# Patient Record
Sex: FEMALE | Race: BLACK OR AFRICAN AMERICAN | NOT HISPANIC OR LATINO | URBAN - NONMETROPOLITAN AREA
[De-identification: names, ages, dates, MRNs, and addresses within clinical notes are randomized per-mention and may not be internally consistent; named-entity substitution may affect disease eponyms.]

---

## 2022-09-11 ENCOUNTER — HISTORICAL (OUTPATIENT)
Dept: ADMINISTRATIVE | Facility: HOSPITAL | Age: 27
End: 2022-09-11

## 2023-06-22 ENCOUNTER — HOSPITAL ENCOUNTER (EMERGENCY)
Facility: HOSPITAL | Age: 28
Discharge: HOME | End: 2023-06-22
Attending: EMERGENCY MEDICINE
Payer: MEDICAID

## 2023-06-22 VITALS
OXYGEN SATURATION: 100 % | HEART RATE: 92 BPM | TEMPERATURE: 98 F | SYSTOLIC BLOOD PRESSURE: 150 MMHG | DIASTOLIC BLOOD PRESSURE: 95 MMHG | WEIGHT: 130 LBS | HEIGHT: 62 IN | BODY MASS INDEX: 23.92 KG/M2 | RESPIRATION RATE: 16 BRPM

## 2023-06-22 DIAGNOSIS — O03.9 MISCARRIAGE: Primary | ICD-10-CM

## 2023-06-22 DIAGNOSIS — O46.90 VAGINAL BLEEDING IN PREGNANCY: ICD-10-CM

## 2023-06-22 LAB
ANION GAP SERPL CALC-SCNC: 9 MEQ/L
BASOPHILS # BLD AUTO: 0.04 X10(3)/MCL
BASOPHILS NFR BLD AUTO: 0.3 %
BUN SERPL-MCNC: 4.5 MG/DL (ref 7–18.7)
CALCIUM SERPL-MCNC: 9.4 MG/DL (ref 8.4–10.2)
CHLORIDE SERPL-SCNC: 105 MMOL/L (ref 98–107)
CO2 SERPL-SCNC: 21 MMOL/L (ref 22–29)
CREAT SERPL-MCNC: 0.76 MG/DL (ref 0.55–1.02)
CREAT/UREA NIT SERPL: 6
EOSINOPHIL # BLD AUTO: 0.1 X10(3)/MCL (ref 0–0.9)
EOSINOPHIL NFR BLD AUTO: 0.6 %
ERYTHROCYTE [DISTWIDTH] IN BLOOD BY AUTOMATED COUNT: 13.2 % (ref 11.5–17)
GFR SERPLBLD CREATININE-BSD FMLA CKD-EPI: >60 MLS/MIN/1.73/M2
GLUCOSE SERPL-MCNC: 105 MG/DL (ref 74–100)
GROUP & RH: NORMAL
HCT VFR BLD AUTO: 34.3 % (ref 37–47)
HGB BLD-MCNC: 11.7 G/DL (ref 12–16)
IMM GRANULOCYTES # BLD AUTO: 0.06 X10(3)/MCL (ref 0–0.04)
IMM GRANULOCYTES NFR BLD AUTO: 0.4 %
INDIRECT COOMBS GEL: NORMAL
LYMPHOCYTES # BLD AUTO: 1.41 X10(3)/MCL (ref 0.6–4.6)
LYMPHOCYTES NFR BLD AUTO: 9.1 %
MCH RBC QN AUTO: 30.2 PG (ref 27–31)
MCHC RBC AUTO-ENTMCNC: 34.1 G/DL (ref 33–36)
MCV RBC AUTO: 88.6 FL (ref 80–94)
MONOCYTES # BLD AUTO: 1.1 X10(3)/MCL (ref 0.1–1.3)
MONOCYTES NFR BLD AUTO: 7.1 %
NEUTROPHILS # BLD AUTO: 12.87 X10(3)/MCL (ref 2.1–9.2)
NEUTROPHILS NFR BLD AUTO: 82.5 %
NRBC BLD AUTO-RTO: 0 %
PLATELET # BLD AUTO: 264 X10(3)/MCL (ref 130–400)
PMV BLD AUTO: 10.1 FL (ref 7.4–10.4)
POTASSIUM SERPL-SCNC: 3.7 MMOL/L (ref 3.5–5.1)
RBC # BLD AUTO: 3.87 X10(6)/MCL (ref 4.2–5.4)
SODIUM SERPL-SCNC: 135 MMOL/L (ref 136–145)
SPECIMEN OUTDATE: NORMAL
WBC # SPEC AUTO: 15.58 X10(3)/MCL (ref 4.5–11.5)

## 2023-06-22 PROCEDURE — 96375 TX/PRO/DX INJ NEW DRUG ADDON: CPT

## 2023-06-22 PROCEDURE — 85025 COMPLETE CBC W/AUTO DIFF WBC: CPT | Performed by: STUDENT IN AN ORGANIZED HEALTH CARE EDUCATION/TRAINING PROGRAM

## 2023-06-22 PROCEDURE — 99285 EMERGENCY DEPT VISIT HI MDM: CPT | Mod: 25

## 2023-06-22 PROCEDURE — 86900 BLOOD TYPING SEROLOGIC ABO: CPT | Performed by: STUDENT IN AN ORGANIZED HEALTH CARE EDUCATION/TRAINING PROGRAM

## 2023-06-22 PROCEDURE — 80048 BASIC METABOLIC PNL TOTAL CA: CPT | Performed by: STUDENT IN AN ORGANIZED HEALTH CARE EDUCATION/TRAINING PROGRAM

## 2023-06-22 PROCEDURE — 88305 TISSUE EXAM BY PATHOLOGIST: CPT | Performed by: EMERGENCY MEDICINE

## 2023-06-22 PROCEDURE — 63600175 PHARM REV CODE 636 W HCPCS: Performed by: EMERGENCY MEDICINE

## 2023-06-22 PROCEDURE — 96374 THER/PROPH/DIAG INJ IV PUSH: CPT

## 2023-06-22 RX ORDER — MORPHINE SULFATE 4 MG/ML
4 INJECTION, SOLUTION INTRAMUSCULAR; INTRAVENOUS
Status: COMPLETED | OUTPATIENT
Start: 2023-06-22 | End: 2023-06-22

## 2023-06-22 RX ORDER — ONDANSETRON 2 MG/ML
4 INJECTION INTRAMUSCULAR; INTRAVENOUS
Status: COMPLETED | OUTPATIENT
Start: 2023-06-22 | End: 2023-06-22

## 2023-06-22 RX ADMIN — MORPHINE SULFATE 4 MG: 4 INJECTION INTRAVENOUS at 08:06

## 2023-06-22 RX ADMIN — ONDANSETRON 4 MG: 2 INJECTION INTRAMUSCULAR; INTRAVENOUS at 08:06

## 2023-06-22 NOTE — Clinical Note
"Radha Adames" Asmita was seen and treated in our emergency department on 6/22/2023.  She may return to work on 06/23/2023.       If you have any questions or concerns, please don't hesitate to call.       RN    "

## 2023-06-23 NOTE — DISCHARGE INSTRUCTIONS
Follow-up with Dr. Gordon, your OBGYN physician tomorrow morning at 9:00 a.m.  at his office as discussed

## 2023-06-26 LAB — PSYCHE PATHOLOGY RESULT: NORMAL

## 2024-06-30 NOTE — ED PROVIDER NOTES
Encounter Date: 2023    SCRIBE #1 NOTE: I, Senia Vi, am scribing for, and in the presence of,  Ayan Drake MD. I have scribed the following portions of the note - Other sections scribed: HPI, ROS, PE.     History     Chief Complaint   Patient presents with    Vaginal Bleeding     G4, P2 approx 18 weeks pregnant. Heavy vaginal bleeding that began less than 1 hour ago. Fetus passed at 1843 per EMS, unsure if placenta passed. OB is Dr Gordon     26 y/o female with no pertinent pmhx presents to ED for passing fetus with amniotic membrane and contractions onset 1730 today. Pt reports she was approximately 18 weeks pregnant and had mild vaginal bleeding that worsened to heavy bleeding after fetus came out. States she has had x2 vaginal births, and denies any previous miscarriages. She currently feels contractions. Per EMS, fetus passed at 1843, unsure if placenta passed.       OB: Enrique Denney MD    The history is provided by the patient and the EMS personnel. No  was used.   Review of patient's allergies indicates:   Allergen Reactions    Pcn [penicillins]      History reviewed. No pertinent past medical history.  History reviewed. No pertinent surgical history.  History reviewed. No pertinent family history.  Social History     Tobacco Use    Smoking status: Never    Smokeless tobacco: Never     Review of Systems   Constitutional:  Negative for activity change, chills, diaphoresis, fatigue and fever.   HENT:  Negative for congestion, ear pain, sinus pain and sore throat.    Eyes:  Negative for visual disturbance.   Respiratory:  Negative for cough, shortness of breath, wheezing and stridor.    Cardiovascular:  Negative for chest pain, palpitations and leg swelling.   Gastrointestinal:  Negative for constipation, diarrhea, nausea, rectal pain and vomiting.   Genitourinary:  Positive for vaginal bleeding. Negative for dysuria and hematuria.   Musculoskeletal:          Physical Therapy  Facility/Department: [facility] MED SURG  Daily Treatment Note  NAME: Kaye Joyce  : 1944  MRN: [id number]    Date of Service: 2024    Discharge Recommendations:  Continue to assess pending progress, Subacute/Skilled Nursing Facility     Patient Diagnosis(es): The primary encounter diagnosis was Urinary tract infection without hematuria, site unspecified. Diagnoses of Hypomagnesemia and Fall, initial encounter were also pertinent to this visit.    Assessment   Assessment: Bed mobility:Dnaiel/CGA. Transfers:Daniel/CGA. Pt. ambulated 35ftx1 with SC and CGA/MinAx1 for safety. Commode use and transfer. Pt. reporting an increase in pain when ambulating from bathroom to chair. Completed reclined seated BLE therex x15 in all available planes of motion.  Activity Tolerance: Patient tolerated evaluation without incident     Plan    Physical Therapy Plan  General Plan: 2 times a day 7 days a week  Specific Instructions for Next Treatment: 1x/ daily on weekends  Current Treatment Recommendations: Strengthening;ROM;Balance training;Functional mobility training;Transfer training;Gait training;Stair training;Manual;Home exercise program;Pain management;Safety education & training;Patient/Caregiver education & training;Equipment evaluation, education, & procurement;Positioning;Therapeutic activities     Restrictions  Restrictions/Precautions  Restrictions/Precautions: Fall Risk, Weight Bearing, General Precautions, Contact Precautions  Required Braces or Orthoses?: Yes  Upper Extremity Weight Bearing Restrictions  Right Upper Extremity Weight Bearing: Non Weight Bearing  Required Braces or Orthoses  Right Upper Extremity Brace/Splint: Right upper extremity in sling- nursing asked about more of an immobilizer brace, therapist informed nursing to check with central supply     Subjective    Subjective  Subjective: Pt. in bed upon arrival, agreeable to therapy at this time  Pain: When asked pt. stated \"i don't  Contractions   Skin:  Negative for rash.   Neurological:  Negative for dizziness, syncope, weakness, numbness and headaches.   All other systems reviewed and are negative.    Physical Exam     Initial Vitals   BP Pulse Resp Temp SpO2   06/22/23 1912 06/22/23 1912 06/22/23 1912 06/22/23 1914 06/22/23 1912   128/60 92 16 97.9 °F (36.6 °C) 98 %      MAP       --                Physical Exam    Nursing note and vitals reviewed.  Constitutional: She appears well-developed and well-nourished. No distress.   HENT:   Head: Normocephalic and atraumatic.   Eyes: EOM are normal.   Neck: Trachea normal. Neck supple.   Normal range of motion.  Cardiovascular:  Normal rate and regular rhythm.           No murmur heard.  Pulmonary/Chest: Breath sounds normal. No respiratory distress.   Abdominal: Abdomen is soft. Bowel sounds are normal. She exhibits no distension. There is no abdominal tenderness. There is no rebound and no guarding.   Genitourinary:    Genitourinary Comments: Fetus passed with amniotic membrane.     Musculoskeletal:         General: Normal range of motion.      Cervical back: Normal range of motion and neck supple.      Lumbar back: Normal range of motion.     Neurological: She is alert and oriented to person, place, and time. She has normal strength.   Skin: Skin is warm and dry. No rash noted.   Psychiatric: She has a normal mood and affect.       ED Course   Procedures  Labs Reviewed   CBC WITH DIFFERENTIAL - Abnormal; Notable for the following components:       Result Value    WBC 15.58 (*)     RBC 3.87 (*)     Hgb 11.7 (*)     Hct 34.3 (*)     Neut # 12.87 (*)     IG# 0.06 (*)     All other components within normal limits   BASIC METABOLIC PANEL - Abnormal; Notable for the following components:    Sodium Level 135 (*)     Carbon Dioxide 21 (*)     Glucose Level 105 (*)     Blood Urea Nitrogen 4.5 (*)     All other components within normal limits   CBC W/ AUTO DIFFERENTIAL    Narrative:     The following  Therapy;Plan of Care;Equipment  Education Method: Verbal  Barriers to Learning: None  Education Outcome: Verbalized understanding    Therapy Time   Individual Concurrent Group Co-treatment   Time In 0707         Time Out 0733         Minutes 26           Jamilah Ku, KURT           orders were created for panel order CBC auto differential.  Procedure                               Abnormality         Status                     ---------                               -----------         ------                     CBC with Differential[[id number]]        Abnormal            Final result                 Please view results for these tests on the individual orders.   URINALYSIS, REFLEX TO URINE CULTURE   PREGNANCY TEST, URINE RAPID   TYPE & SCREEN   SPECIMEN TO PATHOLOGY          Imaging Results              US OB Limited 1 Or More Gestations (Final result)  Result time 06/22/23 20:54:45   Procedure changed from US OB 14+ Wks, TransAbd, Single Gestation     Final result by Derrick Dawkins MD (06/22/23 20:54:45)                   Impression:      Heterogeneous endometrium may represent some blood products but no dominant retained products of conception identified.      Electronically signed by: Derrick Dawkins  Date:    06/22/2023  Time:    20:54               Narrative:    EXAMINATION:  US OB LIMITED 1 OR MORE GESTATIONS    CLINICAL HISTORY:  vaginal bleeding;Antepartum hemorrhage, unspecified, unspecified trimester    TECHNIQUE:  Multiple real-time images were performed pelvis in various planes by the sonographer    FINDINGS:  The endometrium is diffusely heterogeneous and may be related to some blood products but no dominant retained products of conception identified.  Maximum thickness of the endometrium is 1.38 cm.    Right ovary measures 3.2 x 2.4 x 2.7 cm and the left ovary measures 3.6 x 2.7 x 2.5 cm.  Bilateral unremarkable echotexture of the ovaries.  Bilateral unremarkable arteriovenous flow to the ovaries.  No free fluid within the pelvis identified                                       Medications   morphine injection 4 mg (4 mg Intravenous Given 6/22/23 2058)   ondansetron injection 4 mg (4 mg Intravenous Given 6/22/23 2058)              Scribe Attestation:   Scribe #1: I performed the  above scribed service and the documentation accurately describes the services I performed. I attest to the accuracy of the note.    Attending Attestation:           Physician Attestation for Scribe:  Physician Attestation Statement for Scribe #1: I, Ayan Drake MD, reviewed documentation, as scribed by Senia Limon in my presence, and it is both accurate and complete.             ED Course as of 06/22/23 2235   Thu Jun 22, 2023 2114 Patient denies any complaints at this time.  Patient states she urinated and has very little vaginal bleeding.  Patient refused pelvic exam. [KG]   2130 Dr. Gordon was paged [KG]   2131 Patient brought in passed fetal tissue.  Membranes are intact with fetus inside, placenta present. [KG]   2150 Spoke with Dr. Gordon, states send fetal tissue to pathology, reports okay to discharge to home, will see patient at 9:00 a.m. tomorrow morning in the office [KG]      ED Course User Index  [KG] Ayan Drake MD                   Clinical Impression:   Final diagnoses:  [O46.90] Vaginal bleeding in pregnancy  [O03.9] Miscarriage (Primary)        ED Disposition Condition    Discharge Stable          ED Prescriptions    None       Follow-up Information    None          Ayan Drake MD  06/22/23 2158       Ayan Drake MD  06/22/23 2235

## 2024-07-09 LAB
C TRACH RRNA SPEC QL PROBE: POSITIVE
HBV SURFACE AG SERPL QL IA: NEGATIVE
HIV 1+2 AB+HIV1 P24 AG SERPL QL IA: NEGATIVE
N GONORRHOEAE, AMPLIFIED DNA: NEGATIVE
RPR: NON REACTIVE
RUBELLA IMMUNE STATUS: NORMAL

## 2024-10-20 ENCOUNTER — HOSPITAL ENCOUNTER (OUTPATIENT)
Facility: HOSPITAL | Age: 29
Discharge: HOME | End: 2024-10-21
Attending: OBSTETRICS & GYNECOLOGY | Admitting: OBSTETRICS & GYNECOLOGY
Payer: MEDICAID

## 2024-10-20 DIAGNOSIS — Z3A.33 33 WEEKS GESTATION OF PREGNANCY: Primary | ICD-10-CM

## 2024-10-20 DIAGNOSIS — O16.9 HYPERTENSION AFFECTING PREGNANCY: ICD-10-CM

## 2024-10-20 DIAGNOSIS — O99.013 ANEMIA COMPLICATING PREGNANCY IN THIRD TRIMESTER: ICD-10-CM

## 2024-10-20 DIAGNOSIS — O13.9 GESTATIONAL HYPERTENSION: ICD-10-CM

## 2024-10-20 LAB
ALBUMIN SERPL-MCNC: 2.9 G/DL (ref 3.5–5)
ALBUMIN/GLOB SERPL: 0.7 RATIO (ref 1.1–2)
ALP SERPL-CCNC: 130 UNIT/L (ref 40–150)
ALT SERPL-CCNC: <5 UNIT/L (ref 0–55)
ANION GAP SERPL CALC-SCNC: 10 MEQ/L
AST SERPL-CCNC: 15 UNIT/L (ref 5–34)
BASOPHILS # BLD AUTO: 0.05 X10(3)/MCL
BASOPHILS NFR BLD AUTO: 0.6 %
BILIRUB SERPL-MCNC: 0.3 MG/DL
BUN SERPL-MCNC: 3.7 MG/DL (ref 7–18.7)
CALCIUM SERPL-MCNC: 9 MG/DL (ref 8.4–10.2)
CHLORIDE SERPL-SCNC: 106 MMOL/L (ref 98–107)
CO2 SERPL-SCNC: 21 MMOL/L (ref 22–29)
CREAT SERPL-MCNC: 0.67 MG/DL (ref 0.55–1.02)
CREAT UR-MCNC: 289.5 MG/DL (ref 45–106)
CREAT/UREA NIT SERPL: 6
EOSINOPHIL # BLD AUTO: 0.1 X10(3)/MCL (ref 0–0.9)
EOSINOPHIL NFR BLD AUTO: 1.1 %
ERYTHROCYTE [DISTWIDTH] IN BLOOD BY AUTOMATED COUNT: 13.7 % (ref 11.5–17)
FERRITIN SERPL-MCNC: 9.51 NG/ML (ref 4.63–204)
GFR SERPLBLD CREATININE-BSD FMLA CKD-EPI: >60 ML/MIN/1.73/M2
GLOBULIN SER-MCNC: 4 GM/DL (ref 2.4–3.5)
GLUCOSE SERPL-MCNC: 82 MG/DL (ref 74–100)
GROUP & RH: NORMAL
HCT VFR BLD AUTO: 28.4 % (ref 37–47)
HGB BLD-MCNC: 9 G/DL (ref 12–16)
IMM GRANULOCYTES # BLD AUTO: 0.04 X10(3)/MCL (ref 0–0.04)
IMM GRANULOCYTES NFR BLD AUTO: 0.4 %
INDIRECT COOMBS: NORMAL
IRON SATN MFR SERPL: 6 % (ref 20–50)
IRON SERPL-MCNC: 26 UG/DL (ref 50–170)
LDH SERPL-CCNC: 216 U/L (ref 125–220)
LYMPHOCYTES # BLD AUTO: 1.87 X10(3)/MCL (ref 0.6–4.6)
LYMPHOCYTES NFR BLD AUTO: 20.6 %
MCH RBC QN AUTO: 26.4 PG (ref 27–31)
MCHC RBC AUTO-ENTMCNC: 31.7 G/DL (ref 33–36)
MCV RBC AUTO: 83.3 FL (ref 80–94)
MONOCYTES # BLD AUTO: 0.78 X10(3)/MCL (ref 0.1–1.3)
MONOCYTES NFR BLD AUTO: 8.6 %
NEUTROPHILS # BLD AUTO: 6.23 X10(3)/MCL (ref 2.1–9.2)
NEUTROPHILS NFR BLD AUTO: 68.7 %
NRBC BLD AUTO-RTO: 0 %
PLATELET # BLD AUTO: 234 X10(3)/MCL (ref 130–400)
PMV BLD AUTO: 11.1 FL (ref 7.4–10.4)
POTASSIUM SERPL-SCNC: 3.7 MMOL/L (ref 3.5–5.1)
PROT SERPL-MCNC: 6.9 GM/DL (ref 6.4–8.3)
PROT UR STRIP-MCNC: 70.7 MG/DL
RBC # BLD AUTO: 3.41 X10(6)/MCL (ref 4.2–5.4)
SODIUM SERPL-SCNC: 137 MMOL/L (ref 136–145)
SPECIMEN OUTDATE: NORMAL
T PALLIDUM AB SER QL: NONREACTIVE
TIBC SERPL-MCNC: 413 UG/DL (ref 70–310)
TIBC SERPL-MCNC: 439 UG/DL (ref 250–450)
TRANSFERRIN SERPL-MCNC: 416 MG/DL (ref 180–382)
URATE SERPL-MCNC: 5.1 MG/DL (ref 2.6–6)
URINE PROTEIN/CREATININE RATIO (OLG): 0.2
WBC # BLD AUTO: 9.07 X10(3)/MCL (ref 4.5–11.5)

## 2024-10-20 PROCEDURE — 63600175 PHARM REV CODE 636 W HCPCS: Mod: JZ,JG | Performed by: OBSTETRICS & GYNECOLOGY

## 2024-10-20 PROCEDURE — 84550 ASSAY OF BLOOD/URIC ACID: CPT | Performed by: OBSTETRICS & GYNECOLOGY

## 2024-10-20 PROCEDURE — 96374 THER/PROPH/DIAG INJ IV PUSH: CPT

## 2024-10-20 PROCEDURE — 85025 COMPLETE CBC W/AUTO DIFF WBC: CPT | Performed by: OBSTETRICS & GYNECOLOGY

## 2024-10-20 PROCEDURE — 83540 ASSAY OF IRON: CPT | Performed by: OBSTETRICS & GYNECOLOGY

## 2024-10-20 PROCEDURE — 27000492 HC SLEEVE, SCD T/L

## 2024-10-20 PROCEDURE — 63600175 PHARM REV CODE 636 W HCPCS: Performed by: OBSTETRICS & GYNECOLOGY

## 2024-10-20 PROCEDURE — 86850 RBC ANTIBODY SCREEN: CPT | Performed by: OBSTETRICS & GYNECOLOGY

## 2024-10-20 PROCEDURE — G0378 HOSPITAL OBSERVATION PER HR: HCPCS

## 2024-10-20 PROCEDURE — 86901 BLOOD TYPING SEROLOGIC RH(D): CPT | Performed by: OBSTETRICS & GYNECOLOGY

## 2024-10-20 PROCEDURE — 82570 ASSAY OF URINE CREATININE: CPT | Performed by: OBSTETRICS & GYNECOLOGY

## 2024-10-20 PROCEDURE — 86900 BLOOD TYPING SEROLOGIC ABO: CPT | Performed by: OBSTETRICS & GYNECOLOGY

## 2024-10-20 PROCEDURE — 63600175 PHARM REV CODE 636 W HCPCS

## 2024-10-20 PROCEDURE — 84156 ASSAY OF PROTEIN URINE: CPT | Performed by: OBSTETRICS & GYNECOLOGY

## 2024-10-20 PROCEDURE — 99285 EMERGENCY DEPT VISIT HI MDM: CPT | Mod: 25

## 2024-10-20 PROCEDURE — 96375 TX/PRO/DX INJ NEW DRUG ADDON: CPT

## 2024-10-20 PROCEDURE — 80053 COMPREHEN METABOLIC PANEL: CPT | Performed by: OBSTETRICS & GYNECOLOGY

## 2024-10-20 PROCEDURE — 83615 LACTATE (LD) (LDH) ENZYME: CPT | Performed by: OBSTETRICS & GYNECOLOGY

## 2024-10-20 PROCEDURE — 36415 COLL VENOUS BLD VENIPUNCTURE: CPT | Performed by: OBSTETRICS & GYNECOLOGY

## 2024-10-20 PROCEDURE — 25000003 PHARM REV CODE 250: Performed by: OBSTETRICS & GYNECOLOGY

## 2024-10-20 PROCEDURE — 86780 TREPONEMA PALLIDUM: CPT | Performed by: OBSTETRICS & GYNECOLOGY

## 2024-10-20 PROCEDURE — 96372 THER/PROPH/DIAG INJ SC/IM: CPT | Performed by: OBSTETRICS & GYNECOLOGY

## 2024-10-20 PROCEDURE — 83550 IRON BINDING TEST: CPT | Performed by: OBSTETRICS & GYNECOLOGY

## 2024-10-20 PROCEDURE — 82728 ASSAY OF FERRITIN: CPT | Performed by: OBSTETRICS & GYNECOLOGY

## 2024-10-20 RX ORDER — HYDRALAZINE HYDROCHLORIDE 20 MG/ML
10 INJECTION INTRAMUSCULAR; INTRAVENOUS ONCE
Status: DISCONTINUED | OUTPATIENT
Start: 2024-10-20 | End: 2024-10-20

## 2024-10-20 RX ORDER — LABETALOL HYDROCHLORIDE 5 MG/ML
40 INJECTION, SOLUTION INTRAVENOUS ONCE AS NEEDED
Status: DISCONTINUED | OUTPATIENT
Start: 2024-10-20 | End: 2024-10-21 | Stop reason: HOSPADM

## 2024-10-20 RX ORDER — DIPHENHYDRAMINE HYDROCHLORIDE 50 MG/ML
25 INJECTION INTRAMUSCULAR; INTRAVENOUS EVERY 4 HOURS PRN
Status: DISCONTINUED | OUTPATIENT
Start: 2024-10-20 | End: 2024-10-21 | Stop reason: HOSPADM

## 2024-10-20 RX ORDER — NAPROXEN SODIUM 220 MG/1
81 TABLET, FILM COATED ORAL DAILY
Status: ON HOLD | COMMUNITY
End: 2024-10-21 | Stop reason: HOSPADM

## 2024-10-20 RX ORDER — LABETALOL HCL 20 MG/4 ML
SYRINGE (ML) INTRAVENOUS
Status: DISPENSED
Start: 2024-10-20 | End: 2024-10-20

## 2024-10-20 RX ORDER — LABETALOL HYDROCHLORIDE 5 MG/ML
20 INJECTION, SOLUTION INTRAVENOUS ONCE AS NEEDED
Status: COMPLETED | OUTPATIENT
Start: 2024-10-20 | End: 2024-10-20

## 2024-10-20 RX ORDER — AMOXICILLIN 250 MG
1 CAPSULE ORAL NIGHTLY PRN
Status: DISCONTINUED | OUTPATIENT
Start: 2024-10-20 | End: 2024-10-21 | Stop reason: HOSPADM

## 2024-10-20 RX ORDER — HYDRALAZINE HYDROCHLORIDE 20 MG/ML
10 INJECTION INTRAMUSCULAR; INTRAVENOUS ONCE AS NEEDED
Status: DISCONTINUED | OUTPATIENT
Start: 2024-10-20 | End: 2024-10-21 | Stop reason: HOSPADM

## 2024-10-20 RX ORDER — LABETALOL HYDROCHLORIDE 5 MG/ML
20 INJECTION, SOLUTION INTRAVENOUS ONCE
Status: DISCONTINUED | OUTPATIENT
Start: 2024-10-20 | End: 2024-10-20

## 2024-10-20 RX ORDER — ONDANSETRON HYDROCHLORIDE 2 MG/ML
INJECTION, SOLUTION INTRAVENOUS
Status: COMPLETED
Start: 2024-10-20 | End: 2024-10-20

## 2024-10-20 RX ORDER — LABETALOL HYDROCHLORIDE 5 MG/ML
40 INJECTION, SOLUTION INTRAVENOUS ONCE
Status: DISCONTINUED | OUTPATIENT
Start: 2024-10-20 | End: 2024-10-20

## 2024-10-20 RX ORDER — SODIUM CHLORIDE 0.9 % (FLUSH) 0.9 %
10 SYRINGE (ML) INJECTION
Status: DISCONTINUED | OUTPATIENT
Start: 2024-10-20 | End: 2024-10-21 | Stop reason: HOSPADM

## 2024-10-20 RX ORDER — ONDANSETRON HYDROCHLORIDE 2 MG/ML
4 INJECTION, SOLUTION INTRAVENOUS ONCE
Status: COMPLETED | OUTPATIENT
Start: 2024-10-20 | End: 2024-10-20

## 2024-10-20 RX ORDER — PRENATAL WITH FERROUS FUM AND FOLIC ACID 3080; 920; 120; 400; 22; 1.84; 3; 20; 10; 1; 12; 200; 27; 25; 2 [IU]/1; [IU]/1; MG/1; [IU]/1; MG/1; MG/1; MG/1; MG/1; MG/1; MG/1; UG/1; MG/1; MG/1; MG/1; MG/1
1 TABLET ORAL DAILY
Status: DISCONTINUED | OUTPATIENT
Start: 2024-10-20 | End: 2024-10-21 | Stop reason: HOSPADM

## 2024-10-20 RX ORDER — SIMETHICONE 80 MG
1 TABLET,CHEWABLE ORAL EVERY 6 HOURS PRN
Status: DISCONTINUED | OUTPATIENT
Start: 2024-10-20 | End: 2024-10-21 | Stop reason: HOSPADM

## 2024-10-20 RX ORDER — ACETAMINOPHEN 325 MG/1
650 TABLET ORAL EVERY 6 HOURS PRN
Status: DISCONTINUED | OUTPATIENT
Start: 2024-10-20 | End: 2024-10-21 | Stop reason: HOSPADM

## 2024-10-20 RX ORDER — LABETALOL HYDROCHLORIDE 5 MG/ML
80 INJECTION, SOLUTION INTRAVENOUS ONCE AS NEEDED
Status: DISCONTINUED | OUTPATIENT
Start: 2024-10-20 | End: 2024-10-21 | Stop reason: HOSPADM

## 2024-10-20 RX ORDER — DIPHENHYDRAMINE HCL 25 MG
25 CAPSULE ORAL EVERY 4 HOURS PRN
Status: DISCONTINUED | OUTPATIENT
Start: 2024-10-20 | End: 2024-10-21 | Stop reason: HOSPADM

## 2024-10-20 RX ORDER — BETAMETHASONE SODIUM PHOSPHATE AND BETAMETHASONE ACETATE 3; 3 MG/ML; MG/ML
12 INJECTION, SUSPENSION INTRA-ARTICULAR; INTRALESIONAL; INTRAMUSCULAR; SOFT TISSUE
Status: COMPLETED | OUTPATIENT
Start: 2024-10-20 | End: 2024-10-21

## 2024-10-20 RX ORDER — LABETALOL HYDROCHLORIDE 5 MG/ML
80 INJECTION, SOLUTION INTRAVENOUS ONCE
Status: DISCONTINUED | OUTPATIENT
Start: 2024-10-20 | End: 2024-10-20

## 2024-10-20 RX ORDER — ONDANSETRON 4 MG/1
8 TABLET, ORALLY DISINTEGRATING ORAL EVERY 8 HOURS PRN
Status: DISCONTINUED | OUTPATIENT
Start: 2024-10-20 | End: 2024-10-21 | Stop reason: HOSPADM

## 2024-10-20 RX ADMIN — LABETALOL HYDROCHLORIDE 20 MG: 5 INJECTION, SOLUTION INTRAVENOUS at 05:10

## 2024-10-20 RX ADMIN — ONDANSETRON 4 MG: 2 INJECTION INTRAMUSCULAR; INTRAVENOUS at 05:10

## 2024-10-20 RX ADMIN — BETAMETHASONE SODIUM PHOSPHATE AND BETAMETHASONE ACETATE 12 MG: 3; 3 INJECTION, SUSPENSION INTRA-ARTICULAR; INTRALESIONAL; INTRAMUSCULAR at 06:10

## 2024-10-20 RX ADMIN — ONDANSETRON HYDROCHLORIDE 4 MG: 2 INJECTION, SOLUTION INTRAVENOUS at 05:10

## 2024-10-20 RX ADMIN — PRENATAL VITAMINS-IRON FUMARATE 27 MG IRON-FOLIC ACID 0.8 MG TABLET 1 TABLET: at 09:10

## 2024-10-20 NOTE — ED PROVIDER NOTES
[place] NOTE  [facility]     Admit Date: 10/20/2024  [place] Physician: Radha Lou  Primary OBGYN: Dr. Dunn    Admit Diagnosis/Chief Complaint:  headache and elevated BP    Chief Complaint   Patient presents with    Chest Pain    Headache     Headache upper right head (lots of pressure), right upper quad pain, heartburn, vision blurry has been on and off throughout pregnancy, but pain started around 8pm         Hospital Course:  Radha Tian is a 28 y.o.  at 33w2d presents complaining of headache and elevated blood pressure. She reports history of preeclampsia with her two previous deliveries.  She reports no blood pressure issues this pregnancy. She has been taking ASA daily. She required 1 dose of IV labetalol at admit.     Patient denies vaginal bleeding, vision changes, and RUQ pain.  Fetal Movement: normal.    BP (!) 169/91   Pulse 82   Temp 98.2 °F (36.8 °C) (Oral)   Resp 18   SpO2 96%   Breastfeeding No   Temp:  [98.2 °F (36.8 °C)] 98.2 °F (36.8 °C)  Pulse:  [82-95] 82  Resp:  [18] 18  SpO2:  [96 %-99 %] 96 %  BP: (169-184)/() 169/91    General: in no apparent distress  Cardiovascular: regular rate and rhythm no murmurs  Respiratory: clear to auscultation, no wheezes, rales or rhonchi, symmetric air entry unlabored  Abdominal: FHT present            EFM: Cat 1,  modBTV, +accel, no decel (reassuring, reactive)  TOCO:  no contractions      Medical Decision Making:  Elevated blood pressure  Previous history of preeclampsia    LABS:   No results found for this or any previous visit (from the past 24 hours).    Imaging Results    None          ASSESMENT and clinical impression: Radha Tian is a 28 y.o.   at 33w2d with elevated BP    Discharge Diagnosis/clinical impression:   There is no problem list on file for this patient.      Status:Stable    Disposition:  admitted to primary OB service    Admit for observation - reviewed with    BMZ x 2  Preeclampsia labs  24h urine  Growth u/s with UAD  Continuous fetal monitoring        Radha Lou MD  OB/GYN Hospitalist  5:56 AM 10/20/2024

## 2024-10-21 VITALS
OXYGEN SATURATION: 96 % | HEIGHT: 62 IN | RESPIRATION RATE: 17 BRPM | BODY MASS INDEX: 43.43 KG/M2 | TEMPERATURE: 98 F | WEIGHT: 236 LBS | DIASTOLIC BLOOD PRESSURE: 74 MMHG | SYSTOLIC BLOOD PRESSURE: 134 MMHG | HEART RATE: 100 BPM

## 2024-10-21 PROBLEM — Z3A.33 33 WEEKS GESTATION OF PREGNANCY: Status: ACTIVE | Noted: 2024-10-21

## 2024-10-21 LAB
CREAT 24H UR-MCNC: 1553.5 MG/DAY (ref 950–2490)
CREAT UR-MCNC: 119.5 MG/DL (ref 45–106)
PROT 24H UR-MCNC: 331.5 MG/24 H (ref 0–165)
PROT UR STRIP-MCNC: 25.5 MG/DL
TOTAL VOLUME  (OHS): 1300 ML
TOTAL VOLUME  (OHS): 1300 ML

## 2024-10-21 PROCEDURE — 59025 FETAL NON-STRESS TEST: CPT

## 2024-10-21 PROCEDURE — 82570 ASSAY OF URINE CREATININE: CPT | Performed by: OBSTETRICS & GYNECOLOGY

## 2024-10-21 PROCEDURE — 63600175 PHARM REV CODE 636 W HCPCS: Performed by: OBSTETRICS & GYNECOLOGY

## 2024-10-21 PROCEDURE — 84156 ASSAY OF PROTEIN URINE: CPT | Performed by: OBSTETRICS & GYNECOLOGY

## 2024-10-21 PROCEDURE — 96365 THER/PROPH/DIAG IV INF INIT: CPT

## 2024-10-21 PROCEDURE — G0378 HOSPITAL OBSERVATION PER HR: HCPCS

## 2024-10-21 PROCEDURE — 25000003 PHARM REV CODE 250: Performed by: NURSE PRACTITIONER

## 2024-10-21 PROCEDURE — 63600175 PHARM REV CODE 636 W HCPCS: Mod: JZ,JG | Performed by: NURSE PRACTITIONER

## 2024-10-21 PROCEDURE — 96372 THER/PROPH/DIAG INJ SC/IM: CPT | Mod: 59 | Performed by: OBSTETRICS & GYNECOLOGY

## 2024-10-21 PROCEDURE — 99204 OFFICE O/P NEW MOD 45 MIN: CPT | Mod: TH,,, | Performed by: OBSTETRICS & GYNECOLOGY

## 2024-10-21 RX ORDER — LABETALOL 100 MG/1
100 TABLET, FILM COATED ORAL EVERY 12 HOURS
Qty: 60 TABLET | Refills: 1 | Status: SHIPPED | OUTPATIENT
Start: 2024-10-21 | End: 2024-12-20

## 2024-10-21 RX ORDER — LABETALOL 100 MG/1
100 TABLET, FILM COATED ORAL EVERY 12 HOURS
Status: DISCONTINUED | OUTPATIENT
Start: 2024-10-21 | End: 2024-10-21 | Stop reason: HOSPADM

## 2024-10-21 RX ADMIN — LABETALOL HYDROCHLORIDE 100 MG: 100 TABLET, FILM COATED ORAL at 09:10

## 2024-10-21 RX ADMIN — BETAMETHASONE SODIUM PHOSPHATE AND BETAMETHASONE ACETATE 12 MG: 3; 3 INJECTION, SUSPENSION INTRA-ARTICULAR; INTRALESIONAL; INTRAMUSCULAR at 06:10

## 2024-10-21 RX ADMIN — FERUMOXYTOL 510 MG: 510 INJECTION INTRAVENOUS at 09:10

## 2024-10-21 NOTE — DISCHARGE INSTRUCTIONS
Keep scheduled follow up appointments.  Drink plenty of fluids, get plenty of rest, take medications as prescribed.  Call or come back in with any decreased fetal movement, vaginal bleeding, leaking of fluid, or contractions every 3-5mins lasting 1-2 hrs.

## 2024-10-21 NOTE — DISCHARGE SUMMARY
[facility]                       [address] [place] [id number]    PATIENT NAME:       JEAN TIAN  YOB: 1995  CSN:                [id number]   MRN:                [id number]  ADMIT DATE:         10/20/2024 04:48:00  PHYSICIAN:          Enrique Denney MD                          DISCHARGE SUMMARY    DATE OF DISCHARGE:  10/21/2024 13:25:00    Ms. Tian is a 28-year-old female, who is a  4, para 1, who presents at   33 and 2/7th weeks of gestation with cephalgia and elevated blood pressure.  She   was initially evaluated by the hospitalist,  ***.  She was seen by Maternal   Fetal Medicine physician, Dr. Ferris.  She has been started on labetalol 100 mg   b.i.d. Dr. Ferris states that she can go home.  Her blood pressures on day #2   essentially normal.  Her PIH panel was essentially within normal limits.  The   patient denies any clinical symptomatology at this time.  She will be discharged   home on labetalol 100 mg b.i.d. and follow up with Dr. Ferris in office within   24-48 hours.  She has been given risks factors for pregnancy-induced   hypertension.  She is to return to the hospital immediately if she has any   clinical symptomatology.        ______________________________  Enrique Denney MD    DCR/AQS  DD:  10/21/2024  Time:  01:21PM  DT:  10/21/2024  Time:  03:26PM  Job #:  [id number]    cc:    ___________        DISCHARGE SUMMARY       CONSTITUTIONAL: Well groomed, no apparent distress    EYES: PERRL and symmetric, EOMI, No conjunctival or scleral injection, non-icteric    ENMT:   ears: canals clear, no signs of hearing discrepancies  nose: nares clear, no rhinorrhea noted  IOE: adult dentition without signs of decay. no edema, erythema, or active infection. FOM soft, glands productive, uvula midline. maxillary hardware in place with evidence of edema and intraoral recurrent infection.                NECK: Supple, symmetric and without tracheal deviation     RESP: No respiratory distress, no use of accessory muscles; CTA b/l    CV: RRR, no JVD; no peripheral edema    GI: Soft, NT, ND, no rebound, no guarding; no palpable masses; no hepatosplenomegaly    LYMPH: No cervical LAD or tenderness; no axillary LAD or tenderness    MSK: Normal gait; No digital clubbing or cyanosis; examination of the head/neck without misalignment,            Normal ROM without pain, no spinal tenderness, normal muscle strength/tone    SKIN: No rashes or ulcers noted; no subcutaneous nodules or induration palpable    NEURO: CN II-XII intact; normal reflexes in upper, sensation intact in upper and lower extremities b/l to light touch     PSYCH: Appropriate insight/judgment; A+O x 3, mood and affect appropriate, recent/remote memory intact

## 2024-10-21 NOTE — NURSING
Detailed discharge instructions reviewed with patient, along with printed materials given. All questions answered.  Advised patient to keep scheduled follow up appointments, drink plenty of fluids, and get plenty of rest.  Instructed patient to call or come back in with any decreased fetal movement, vaginal bleeding, leaking of fluid, or painful contractions occurring every 3-5mins lasting 1-2 hrs.  Pt verbalized understanding.  No complaints at this time. Pt ambulated off of unit upon d/c with no acute distress noted.

## 2024-10-21 NOTE — CONSULTS
Maternal Fetal Medicine Consult Note    Radha Tian is a 28 y.o.  female  at 33w3d  gestation admitted to rule out preeclampsia.  Upon chart review, appears patient has documented history of chronic hypertension as well as several mild range blood pressures outside of pregnancy.  On previous hospitalization reviewed records from Dr. Denney that is states chronic hypertension with superimposed preeclampsia, along with multiple elevations of blood pressure confirming chronic hypertension.  She presented yesterday with some complaints of blurred vision and headaches.  She was noted to have initial severe range blood pressure that responded well to 1 dose of IV labetalol. Overnight her blood pressures have remained stable with a few in mild range, otherwise normal.  She had labs on admission that were stable with no evidence of preeclampsia.  She had 24 hour urine with just above 300 mg protein, but no baseline for comparison.  She was noted to have mild anemia and had iron studies. She has elevated BMI greater than 40.  There was mild fetal growth restriction noted on ultrasound with normal UAD.  She received betamethasone on 10/20 and 10/21.  Currently, she has no complaints. She denies leaking of fluid, vaginal bleeding, contractions, or decreased fetal movement. She also denies headaches, visual disturbances, or epigastric pain.    Review of Systems   12 point review of systems conducted, negative except as stated in the history of present illness. See HPI for details.    Past Medical History:   Diagnosis Date    Gestational HTN         Past Surgical History:   Procedure Laterality Date    TONSILLECTOMY                No family history on file.     Temp:  [98 °F (36.7 °C)-98.2 °F (36.8 °C)] 98 °F (36.7 °C)  Pulse:  [] 95  Resp:  [17-18] 17  SpO2:  [87 %-100 %] 97 %  BP: (116-149)/(59-96) 149/82    Physical Exam  Vitals and nursing note reviewed.   Constitutional:       Appearance: Normal  appearance.      Comments: Increased BMI   HENT:      Head: Normocephalic and atraumatic.      Nose: Nose normal. No congestion.   Cardiovascular:      Rate and Rhythm: Normal rate.   Pulmonary:      Effort: Pulmonary effort is normal.   Musculoskeletal:      Comments: Trace edema bilaterally with normal reflexes   Skin:     Findings: No rash.   Neurological:      Mental Status: She is alert and oriented to person, place, and time.   Psychiatric:         Mood and Affect: Mood normal.         Behavior: Behavior normal.         Thought Content: Thought content normal.         Judgment: Judgment normal.          Recent Results (from the past 48 hours)   Comprehensive metabolic panel    Collection Time: 10/20/24  5:25 AM   Result Value Ref Range    Sodium 137 136 - 145 mmol/L    Potassium 3.7 3.5 - 5.1 mmol/L    Chloride 106 98 - 107 mmol/L    CO2 21 (L) 22 - 29 mmol/L    Glucose 82 74 - 100 mg/dL    Blood Urea Nitrogen 3.7 (L) 7.0 - 18.7 mg/dL    Creatinine 0.67 0.55 - 1.02 mg/dL    Calcium 9.0 8.4 - 10.2 mg/dL    Protein Total 6.9 6.4 - 8.3 gm/dL    Albumin 2.9 (L) 3.5 - 5.0 g/dL    Globulin 4.0 (H) 2.4 - 3.5 gm/dL    Albumin/Globulin Ratio 0.7 (L) 1.1 - 2.0 ratio    Bilirubin Total 0.3 <=1.5 mg/dL     40 - 150 unit/L    ALT <5 0 - 55 unit/L    AST 15 5 - 34 unit/L    eGFR >60 mL/min/1.73/m2    Anion Gap 10.0 mEq/L    BUN/Creatinine Ratio 6    Lactate Dehydrogenase    Collection Time: 10/20/24  5:25 AM   Result Value Ref Range    Lactate Dehydrogenase 216 125 - 220 U/L   Uric acid    Collection Time: 10/20/24  5:25 AM   Result Value Ref Range    Uric Acid 5.1 2.6 - 6.0 mg/dL   Protein/Creatinine Ratio, Urine    Collection Time: 10/20/24  5:25 AM   Result Value Ref Range    Urine Protein Level 70.7 mg/dL    Urine Creatinine 289.5 (H) 45.0 - 106.0 mg/dL    Urine Protein/Creatinine Ratio 0.2    CBC with Differential    Collection Time: 10/20/24  5:25 AM   Result Value Ref Range    WBC 9.07 4.50 - 11.50 x10(3)/mcL     RBC 3.41 (L) 4.20 - 5.40 x10(6)/mcL    Hgb 9.0 (L) 12.0 - 16.0 g/dL    Hct 28.4 (L) 37.0 - 47.0 %    MCV 83.3 80.0 - 94.0 fL    MCH 26.4 (L) 27.0 - 31.0 pg    MCHC 31.7 (L) 33.0 - 36.0 g/dL    RDW 13.7 11.5 - 17.0 %    Platelet 234 130 - 400 x10(3)/mcL    MPV 11.1 (H) 7.4 - 10.4 fL    Neut % 68.7 %    Lymph % 20.6 %    Mono % 8.6 %    Eos % 1.1 %    Basophil % 0.6 %    Lymph # 1.87 0.6 - 4.6 x10(3)/mcL    Neut # 6.23 2.1 - 9.2 x10(3)/mcL    Mono # 0.78 0.1 - 1.3 x10(3)/mcL    Eos # 0.10 0 - 0.9 x10(3)/mcL    Baso # 0.05 <=0.2 x10(3)/mcL    IG# 0.04 0 - 0.04 x10(3)/mcL    IG% 0.4 %    NRBC% 0.0 %   Ferritin    Collection Time: 10/20/24  5:25 AM   Result Value Ref Range    Ferritin Level 9.51 4.63 - 204.00 ng/mL   Iron and TIBC    Collection Time: 10/20/24  5:25 AM   Result Value Ref Range    Iron Binding Capacity Unsaturated 413 (H) 70 - 310 ug/dL    Iron Level 26 (L) 50 - 170 ug/dL    Transferrin 416 (H) 180 - 382 mg/dL    Iron Binding Capacity Total 439 250 - 450 ug/dL    Iron Saturation 6 (L) 20 - 50 %   Type & Screen    Collection Time: 10/20/24  5:34 AM   Result Value Ref Range    Group & Rh O POS     Indirect Jong GEL NEG     Specimen Outdate 10/23/2024 23:59    SYPHILIS ANTIBODY (WITH REFLEX RPR)    Collection Time: 10/20/24  5:34 AM   Result Value Ref Range    Syphilis Antibody Nonreactive Nonreactive, Equivocal   Protein, 24 Hr Urine    Collection Time: 10/21/24  5:27 AM   Result Value Ref Range    Total Volume 1,300 mL    Urine Protein Level 25.5 mg/dL    Urine 24 Hour Protein 331.5 (H) 0.0 - 165.0 mg/24 h   Creatinine, 24 Hr Urine    Collection Time: 10/21/24  5:27 AM   Result Value Ref Range    Total Volume 1,300 mL    Urine Creatinine 119.5 (H) 45.0 - 106.0 mg/dL    Urine 24 Hour Creatinine 1,553.5 950.0 - 2,490.0 mg/day        US Doppler Umbilical Artery  Narrative: EXAMINATION:  US OB LIMITED 1 OR MORE GESTATIONS; US DOPP UMBILICAL ARTERY    CLINICAL HISTORY:  Unspecified maternal hypertension,  unspecified trimester    COMPARISON:  No priors    FINDINGS:  There is a single intrauterine gestation demonstrated in vertex presentation. Fetal heart rate was 151 BPM.  Posterior placenta.  Detailed fetal anatomic survey was not performed.  Doppler evaluation of the umbilical artery demonstrates good diastolic flow.  PS/ED ratio 2.4.  The RICKY is 11 cm.    Average ultrasound age is 33 weeks 4 days.    BPD = 8.6 cm-34 weeks 3 days.    HC = 31.0 cm-34 weeks 5 days.    AC = 27.4 cm-31 weeks 3 days.    FL = 6.6 cm-33 weeks 6 days.    Estimated fetal weight-2038 grams +/-306 grams (4 pounds 8 ounces +/-11 ounces).  This is 36th percentile.  Impression: Single intrauterine gestation with average ultrasound age of 33 weeks 4 days as described.    Electronically signed by: Noble Garibay  Date:    10/20/2024  Time:    08:45  US OB Limited 1 Or More Gestations  Narrative: EXAMINATION:  US OB LIMITED 1 OR MORE GESTATIONS; US DOPP UMBILICAL ARTERY    CLINICAL HISTORY:  Unspecified maternal hypertension, unspecified trimester    COMPARISON:  No priors    FINDINGS:  There is a single intrauterine gestation demonstrated in vertex presentation. Fetal heart rate was 151 BPM.  Posterior placenta.  Detailed fetal anatomic survey was not performed.  Doppler evaluation of the umbilical artery demonstrates good diastolic flow.  PS/ED ratio 2.4.  The RICKY is 11 cm.    Average ultrasound age is 33 weeks 4 days.    BPD = 8.6 cm-34 weeks 3 days.    HC = 31.0 cm-34 weeks 5 days.    AC = 27.4 cm-31 weeks 3 days.    FL = 6.6 cm-33 weeks 6 days.    Estimated fetal weight-2038 grams +/-306 grams (4 pounds 8 ounces +/-11 ounces).  This is 36th percentile.  Impression: Single intrauterine gestation with average ultrasound age of 33 weeks 4 days as described.    Electronically signed by: Noble Garibay  Date:    10/20/2024  Time:    08:45      Assessment/Plan    33w3d with:    Chronic hypertension with mild fetal growth restriction based on the  ultrasound done in the hospital     BP's reviewed. Overall stable with some mild range after initial dose of IV labetalol.   She has normal physical exam and no signs/symptoms of preeclampsia at this time.   Reasonable to d/c to home at this time on labetalol 100 mg every 12 hours with outpatient followup as below.   Advised to report any headaches, visual changes, RUQ pain, epigastric pain, edema.      Mild fetal growth restriction   Discussed various causes of fetal growth restriction including constitutional factors, placental insufficiency, aneuploidy, viral infections, as well as maternal issues including hypertension.  Discussed option of cell free DNA as well as amniocentesis with risks/benefits of each option.  Patient may do cell free DNA that we could arrange as outpatient.      Complications of growth-restricted neonates include hypoglycemia, hyperbilirubinemia, hypothermia, seizures, sepsis, IVH, RDS, necrotizing enterocolitis, and  asphyxia. Long-term complications include cognitive delay and adult diseases such as cardiovascular disorders and type 2 diabetes. There is an increased risk (~20%) for recurrence of fetal growth restriction in a future pregnancy.    Recommend twice weekly fetal testing along with her doing fetal kick counts at home.  Advised to immediately return for any decreased fetal movement.    Patient we will be scheduled for office evaluation on 10/21/2024.  Fetal kick count and preeclampsia warnings.    She already got a course of steroids.  Delivery is not indicated at this time and the risk of prematurity outweighed the risk of continued pregnancy.  We will evaluate in the office and finalized plan for delivery recommendations once complete anatomy assessment is done.      Anemia  Recommend IV Feraheme 510 mg once while on CEFM  Recommend start oral hematinic therapy also      Increased BMI  Body mass index is 43.16 kg/m².    Risks associated with increased BMI include  risk of miscarriage in first trimester, recurrent miscarriages, congenital anomalies, hypertension, diabetes,  labor and the higher risk of  section and the higher risk of fetal demise in-utero. There is also higher risk of for excessive fetal weight and large for gestational age (LGA) fetuses. Mothers with LGA fetuses are at higher risk of prolonged labor,  delivery, shoulder dystocia and birth trauma. LGA neonates are increased risk of fetal hypoxia and intrauterine death, and are at risk to develop diabetes, obesity, metabolic syndrome, asthma and cancer later in life.  I recommended low calorie, low fat diet avoiding any additional excessive weight gain. Excess weight gain would be associated with worsening hypertension, gestational diabetes and adverse  outcomes, including fetal demise in utero.    Discussed importance of starting future pregnancy at a lower weight to reduce the risk of complications associated with elevated BMI such as hypertension and gestational diabetes. Breastfeeding may be an important tool in reducing the postpartum weight retention. Recommend eating healthy and avoiding excessive weight gain from now until delivery. If BMI over 30 at initial visit in future pregnancies, recommend baby aspirin starting at 12 weeks.         This note was created with the assistance of [employer] voice recognition software. There may be transcription errors as a result of using this technology, however minimal. Effort has been made to assure accuracy of transcription, but any obvious errors or omissions should be clarified with the author of the document.          Patient was evaluated and examined by Dr. Ferris. FELA Rehman, helped in pre charting of part of note.

## 2024-10-21 NOTE — PLAN OF CARE
Problem:  Fall Injury Risk  Goal: Absence of Fall, Infant Drop and Related Injury  Outcome: Progressing     Problem: Adult Inpatient Plan of Care  Goal: Plan of Care Review  Outcome: Progressing  Goal: Patient-Specific Goal (Individualized)  Outcome: Progressing  Goal: Absence of Hospital-Acquired Illness or Injury  Outcome: Progressing  Goal: Optimal Comfort and Wellbeing  Outcome: Progressing  Goal: Readiness for Transition of Care  Outcome: Progressing     Problem: Bariatric Environmental Safety  Goal: Safety Maintained with Care  Outcome: Progressing

## 2024-10-22 DIAGNOSIS — O36.5930 INTRAUTERINE GROWTH RETARDATION AFFECTING MOTHER, ANTEPARTUM, THIRD TRIMESTER, NOT APPLICABLE OR UNSPECIFIED FETUS: ICD-10-CM

## 2024-10-22 DIAGNOSIS — O10.013 PRE-EXISTING ESSENTIAL HYPERTENSION COMPLICATING PREGNANCY IN THIRD TRIMESTER: Primary | ICD-10-CM

## 2024-10-22 PROBLEM — O99.212 OBESITY AFFECTING PREGNANCY IN SECOND TRIMESTER: Status: ACTIVE | Noted: 2024-10-22

## 2024-10-22 PROBLEM — O36.5990 PREGNANCY AFFECTED BY FETAL GROWTH RESTRICTION: Status: ACTIVE | Noted: 2024-10-22

## 2024-10-22 PROBLEM — O10.919 CHRONIC HYPERTENSION AFFECTING PREGNANCY: Status: ACTIVE | Noted: 2024-10-22

## 2024-10-22 PROBLEM — O99.013 ANEMIA DURING PREGNANCY IN THIRD TRIMESTER: Status: ACTIVE | Noted: 2024-10-22

## 2024-10-22 PROBLEM — O99.213 OBESITY AFFECTING PREGNANCY IN THIRD TRIMESTER: Status: ACTIVE | Noted: 2024-10-22

## 2024-10-22 NOTE — PROGRESS NOTES
Maternal Fetal Medicine Follow Up    Subjective:     Patient ID: [id number]    Chief Complaint: MFM follow up with US      HPI: Radha Tian is a 28 y.o. female  at 33w5d gestation with Estimated Date of Delivery: 24  who is here for follow-up consultation by MFM.    Patient was seen in the hospital around 10/20 for labile blood pressure.  She received course of betamethasone on 10/20 and 10/21.  After review of records, noted well documented history of chronic hypertension, and she was started on labetalol 100 mg every 12 hours. She had 24 hour urine protein 331.5mg protein on 10/21 but no baseline for comparison. She was also noted to have suspected fetal growth restriction while in the hospital.  She has iron deficiency anemia with H/H 9.0/28.4 on 10/20.  She received a dose of IV Feraheme 10/21.  She has elevated BMI of 43.9 on initial MFM consult visit.    Patient reported that she has an infected tooth that is going to be extracted later today.       Interval history since last MFM visit: None.. She denies any leaking fluid, vaginal bleeding, contractions, decreased fetal movement. Denies headaches, visual disturbances, or epigastric pain.    Pregnancy complications include:   Patient Active Problem List   Diagnosis    Chronic hypertension affecting pregnancy    Increased BMI over 40 affecting pregnancy in third trimester    Anemia during pregnancy in third trimester    Thickened placenta    Light for gestational dates affecting management of mother, third trimester, fetus 1 [O36.5931]       No changes to medical, surgical, family, social, or obstetric history.    Medications:  Current Outpatient Medications   Medication Instructions    ascorbic acid (vitamin C) (VITAMIN C) 250 mg, Oral, Every other day    ferrous sulfate (FEOSOL) 325 mg, Oral, Every other day    folic acid (FOLVITE) 1 mg, Oral, Every other day    labetaloL (NORMODYNE) 100 mg, Oral, Every 12 hours       Review of Systems   12  "point review of systems conducted, negative except as stated in the history of present illness. See HPI for details.      Objective:     Visit Vitals  BP (!) 140/92 (BP Location: Right arm, Patient Position: Sitting)   Pulse 87   Ht 5' 2" (1.575 m)   Wt 108.9 kg (240 lb)   BMI 43.90 kg/m²        Physical Exam  Vitals and nursing note reviewed.   Constitutional:       General: She is not in acute distress.     Appearance: Normal appearance.      Comments: Increased BMI   HENT:      Head: Normocephalic and atraumatic.      Nose: Nose normal. No congestion.      Mouth/Throat:      Pharynx: Oropharynx is clear.   Eyes:      General: No scleral icterus.     Conjunctiva/sclera: Conjunctivae normal.   Cardiovascular:      Rate and Rhythm: Normal rate and regular rhythm.   Pulmonary:      Effort: No respiratory distress.      Breath sounds: Normal breath sounds. No wheezing.   Abdominal:      General: Abdomen is flat.      Palpations: Abdomen is soft.      Tenderness: There is no abdominal tenderness. There is no right CVA tenderness, left CVA tenderness or guarding.      Comments: No CVA tenderness gravid uterus.    Musculoskeletal:         General: Normal range of motion.      Cervical back: Neck supple.      Right lower leg: No edema.      Left lower leg: No edema.   Skin:     General: Skin is warm.      Findings: No bruising or rash.   Neurological:      General: No focal deficit present.      Mental Status: She is oriented to person, place, and time.      Deep Tendon Reflexes: Reflexes normal.      Comments: Normal reflexes   Psychiatric:         Mood and Affect: Mood normal.         Behavior: Behavior normal.         Thought Content: Thought content normal.         Judgment: Judgment normal.         Assessment/Plan:     28 y.o.  female with IUP at 33w5d    Chronic hypertension   There is low normal fetal growth with an EFW of 1977 g at the 13% and the AC at the 12% on 10/23/24  AFV is normal. Biophysical " profile  today, 10/23/2024.  Normal umbilical artery Doppler today, 10/23/2024.    Chronic hypertension complicates up to 2-5% of pregnancies and is associated with significant adverse pregnancy outcomes including  birth, fetal growth restriction, fetal demise, placental abruption, and  delivery. Recent data suggest higher risk of certain congenital anomalies, including, heart defects, hypospadias and esophageal atresia. The incidence of adverse outcomes seen in pregnancies complicated by chronic hypertension is related to the degree and duration of hypertension and to the association of other organ system involvement or damage. Most pregnant women with mild chronic hypertension have uneventful pregnancies with no end-organ involvement. Comparing women who developed superimposed preeclampsia with women who did not, the incidence of  birth was 100% versus 38%, the incidence of small-for-gestational-age (SGA) infants was 78% versus 15%, and the  mortality rate was 48% versus 0%. Besides superimposed preeclampsia or eclampsia, pregnancy complicated by chronic hypertension (especially if severe) may be associated with worsening or malignant hypertension, central nervous system hemorrhage, cardiac decompensation, and renal deterioration or failure.    The age of onset, results of previous evaluation, severity and duration of hypertension, and physical examination are important determinants of which clinical tests may be useful. Ideally, a woman with chronic hypertension should be evaluated before pregnancy to ascertain potentially reversible causes and end-organ involvement (eg, heart or kidney). Baseline laboratory tests may include serum creatinine, blood urea nitrogen, electrolytes, CBC and 24-hour urine evaluation for total protein and creatinine clearance. Women who have had hypertension for several years are more likely to have cardiomegaly, ischemic heart disease, renal  "involvement, and retinopathy. In patients with severe disease over 4 years, or long standing hypertension (typically if over 30 years old), tests which may prove useful in the evaluation of these women include electrocardiography, echocardiography, ophthalmologic examination, and renal ultrasonography.     Women with paroxysmal hypertension, frequent "hypertensive crisis," seizure disorders, or anxiety attacks should be evaluated for pheochromocytoma with measurements of 24-hour urine vanillylmandelic acid, metanephrines, or unconjugated catecholamines. Magnetic resonance imaging after the first trimester or computed tomography may be useful for adrenal tumor localization. Renal artery stenosis appears to be more prevalent in patients with type-2 diabetes and coexistent hypertension. Doppler flow studies or magnetic resonance angiography are helpful to detect renal artery stenosis. Negative results from renal ultrasonography do not exclude renal artery stenosis.     In women with chronic hypertension, it can be difficult to discern worsening hypertension that might occur as a result of physiological changes of pregnancy in the third trimester from the development of preeclampsia. The use of certain laboratory tests such as serum creatinine, liver functions tests, hematocrit and platelets to compare to baseline values from early in pregnancy might serve to delineate these conditions. Routine screening of women with chronic hypertension with these laboratory tests is not routinely indicated.    I have shared with her the normal natural course of chronic hypertension in pregnancy with improvement early on and likely increasing blood pressure as the pregnancy advances. Based on findings of recent CHAP Study, it is recommended to utilize 140/90 as the threshold for initiation or titration of medical therapy for chronic hypertension in pregnancy, rather than the previously recommended threshold of 160/110. For patients on " blood pressure medications at the start of pregnancy, in the absence of mitigating factors or side effects, they can be maintained on their medications, rather than discontinuing them and waiting to initiate treatment for blood pressures in the severe range. Commonly used medications include nifedipine and labetalol.    Vitals:    10/23/24 1326 10/23/24 1329   BP: (!) 143/96 (!) 140/92     Patient did not take her medication today.  With this blood pressure, she was advised to take labetalol 100 mg q.12 hours and emphasized importance of strict compliance with the medication.  She is also to follow a low sodium diet avoiding any excessive weight gain.     Recommend low-dose aspirin once daily starting around 12 weeks and continued until delivery in any future pregnancy.    She was advised of the higher risk of fetal growth restriction and superimposed preeclampsia with her underlying chronic hypertension. The risk of placental abruption was also discussed. No prevention is available with her reporting any bleeding or cramping. She was also advised to report any headaches, visual problems, epigastric pain.    There is no consensus as to the most appropriate fetal surveillance test(s) or the interval and timing of testing in  with chronic hypertension. Thus, such testing should be individualized and based on clinical judgment and on severity of disease.    With chronic hypertension on medication and increased BMI over 40, recommend twice weekly fetal testing, alternating with primary OB, until delivery.    With chronic hypertension on medication, increased BMI, and low-normal fetal growth, recommend delivery at 38 weeks' gestation (38-38 6/7th weeks) as optimal balance between prematurity risks and risks of continued pregnancy. Earlier delivery may be needed for worsening maternal or fetal status.       Low-normal fetal growth  Although low-normal today, discussed range of error of ultrasound and possibility  for more significant fetal growth restriction especially with her history of hypertension.  I discussed potential etiologies of FGR include but are not limited to normal variation of stature, placental insufficiency, chromosomal abnormalities, genetic disorders, infections, medical conditions, teratogen exposure and other etiologies.  We discussed the increased risk of stillbirth and the potential need for earlier delivery.The potential for constitutional factor as a contributing factor was addressed in addition to other contributing factors such as conditions predisposing to vascular disease such as pregestational diabetes, chronic renal disease, autoimmune disorders; umbilical cord abnormalities; substance use; and multiple gestation. Although uteroplacental insufficiency and/or constitutional factors (if relevant) are the likely etiology, the potential for anomalies not seen with the ultrasound, aneuploidy, or in-utero viral infections are there, but these are very unlikely to be the cause with no other ultrasound findings. Based on a study in 2018, there is an abnormal microarray in 3% of isolated FGR. I discussed and offered genetic amniocentesis, to check for microarray and CMV was offered. The benefits and risks were discussed. She declined amniocentesis . She was advised of need for  evaluation.    She was counseled on Cell free DNA understanding that it is an enhanced screening test but not a diagnostic test. It assesses risk for common aneuploidies such as Trisomy 13, 18, and 21 by evaluating cell-free fetal DNA in maternal circulation with a false positive rate less than 0.5% for Trisomy 21 and less reliable for Trisomy 13 and Trisomy 18. She accepted cell free DNA and order was given.    Complications of growth-restricted neonates include hypoglycemia, hyperbilirubinemia, hypothermia, seizures, sepsis, IVH, RDS, necrotizing enterocolitis, and  asphyxia. Long-term complications include  cognitive delay and adult diseases such as cardiovascular disorders and type 2 diabetes. There is an increased risk (~20%) for recurrence of fetal growth restriction in a future pregnancy.    Fetal testing and delivery timing as above.      Iron-deficiency anemia   The World Health Organization (WHO) defines anemia as a hemoglobin level <11 g/dL (approximately equivalent to a hematocrit <33 percent) in the first trimester, <10.5 g/dL in the second trimester, <10.5 to 11 g/dL in the third trimester, or <10 g/dL postpartum. Anemia affects approximately 30 percent of reproductive-age females and 40 percent of pregnant individuals, mostly due to iron deficiency.       Physiologic anemia of pregnancy and iron deficiency are the two most common causes of anemia in pregnancy. Much less common causes of anemia include hemoglobinopathies (sickle cell, thalassemia), RBC membrane disorders (hereditary spherocytosis and had hereditary Elliptocytosis), and acquired anemias (folate deficiency, vitamin B12 deficiency, other vitamin deficiencies, autoimmune hemolytic anemia Anemia, hypothyroidism and chronic kidney disease). All gravidas with anemia or symptoms of anemia should have prompt testing for iron deficiency because iron deficiency can progress to anemia; iron deficiency is the most common pathologic cause of anemia in pregnancy.    I discussed with her that iron deficiency during the first two trimesters of pregnancy is associated with a 2-fold increased risk for  delivery and a 3-fold increased risk for delivering a low-birth-weight baby.    She was advised to start oral hematinic therapy with ferrous sulfate 325 mg every other day, vitamin-C 250 mg every other day, and folic acid 1 mg every other day.  Recommend intermittent CBC throughout pregnancy to assess response to therapy.      Elevated BMI  Body mass index is 43.9 kg/m².    Risks associated with increased BMI include risk of miscarriage in first  trimester, recurrent miscarriages, congenital anomalies, hypertension, diabetes,  labor and the higher risk of  section and the higher risk of fetal demise in-utero. There is also higher risk of for excessive fetal weight and large for gestational age (LGA) fetuses. Mothers with LGA fetuses are at higher risk of prolonged labor,  delivery, shoulder dystocia and birth trauma. LGA neonates are increased risk of fetal hypoxia and intrauterine death, and are at risk to develop diabetes, obesity, metabolic syndrome, asthma and cancer later in life.  I recommended low calorie, low fat diet avoiding any additional excessive weight gain. Excess weight gain would be associated with worsening hypertension, gestational diabetes and adverse  outcomes, including fetal demise in utero.      Importance of working on losing weight after the pregnancy is over, especially before a future pregnancy was discussed. Breastfeeding may be an important tool in reducing the postpartum weight retention. Fetal risks were discussed with short term risk of fetal/ obesity and long term risk of adolescent component of metabolic syndrome.    Discussed importance of starting future pregnancy at a lower weight to reduce the risk of complications associated with elevated BMI such as hypertension and gestational diabetes. Recommend eating healthy and avoiding excessive weight gain from now until delivery. If BMI over 30 at initial visit in future pregnancies, recommend baby aspirin starting at 12 weeks.      Thickened placenta  With thickened placenta, I discussed it could be associated with higher risk for FGR, aneuploidy, abruption, infection and hypertensive disease in pregnancy/preeclampsia. I discussed and offered genetic amniocentesis. She declined amniocentesis  and is aware of the need for  evaluation. Recommend monitoring fetal growth as above. Preeclampsia precautions given.     Patient was  counseled on the potential causes of fetal growth restriction.  Fetal growth is just above the 10th percentile at this time.  Chronic hypertension is likely etiology with anemia likely contributing factor.  Possibility of the aneuploidy or viral infections discussed.  Unlikely with no other ultrasound findings.  Patient will do cell free DNA but did not want to do an amniocentesis.  Discussed with Dr. Denney who will see her later in the week do an NST and will see her early in the week for a BPP.  We will plan to rechecked Doppler in 2 weeks.      Follow up in about 5 days (around 10/28/2024) for MFM follow-up, BPP, Thursday.     No future appointments.       ARELY involvement: Patient was evaluated and examined by Dr. Ferris. APPs (NATY Culp and/or FELA Rehman) assisted in completion of part of note.    This note was created with the assistance of [employer] voice recognition software. There may be transcription errors as a result of using this technology, however minimal. Effort has been made to ensure accuracy of transcription, but any obvious errors or omissions should be clarified with the author of the document.

## 2024-10-23 ENCOUNTER — OFFICE VISIT (OUTPATIENT)
Dept: MATERNAL FETAL MEDICINE | Facility: CLINIC | Age: 29
End: 2024-10-23
Payer: MEDICAID

## 2024-10-23 ENCOUNTER — PROCEDURE VISIT (OUTPATIENT)
Dept: MATERNAL FETAL MEDICINE | Facility: CLINIC | Age: 29
End: 2024-10-23
Payer: MEDICAID

## 2024-10-23 VITALS
DIASTOLIC BLOOD PRESSURE: 92 MMHG | BODY MASS INDEX: 44.16 KG/M2 | HEART RATE: 87 BPM | HEIGHT: 62 IN | SYSTOLIC BLOOD PRESSURE: 140 MMHG | WEIGHT: 240 LBS

## 2024-10-23 DIAGNOSIS — O36.5930 INTRAUTERINE GROWTH RETARDATION AFFECTING MOTHER, ANTEPARTUM, THIRD TRIMESTER, NOT APPLICABLE OR UNSPECIFIED FETUS: ICD-10-CM

## 2024-10-23 DIAGNOSIS — O10.013 PRE-EXISTING ESSENTIAL HYPERTENSION COMPLICATING PREGNANCY IN THIRD TRIMESTER: ICD-10-CM

## 2024-10-23 DIAGNOSIS — E66.01 SEVERE OBESITY DUE TO EXCESS CALORIES AFFECTING PREGNANCY IN THIRD TRIMESTER: ICD-10-CM

## 2024-10-23 DIAGNOSIS — O99.013 ANEMIA DURING PREGNANCY IN THIRD TRIMESTER: ICD-10-CM

## 2024-10-23 DIAGNOSIS — O36.5931 LIGHT FOR GESTATIONAL DATES AFFECTING MANAGEMENT OF MOTHER, THIRD TRIMESTER, FETUS 1: ICD-10-CM

## 2024-10-23 DIAGNOSIS — O28.3 ABNORMAL PRENATAL ULTRASOUND: ICD-10-CM

## 2024-10-23 DIAGNOSIS — O10.919 CHRONIC HYPERTENSION AFFECTING PREGNANCY: Primary | ICD-10-CM

## 2024-10-23 DIAGNOSIS — O99.213 SEVERE OBESITY DUE TO EXCESS CALORIES AFFECTING PREGNANCY IN THIRD TRIMESTER: ICD-10-CM

## 2024-10-23 PROBLEM — Z3A.33 33 WEEKS GESTATION OF PREGNANCY: Status: RESOLVED | Noted: 2024-10-21 | Resolved: 2024-10-23

## 2024-10-23 PROBLEM — O36.5990 PREGNANCY AFFECTED BY FETAL GROWTH RESTRICTION: Status: RESOLVED | Noted: 2024-10-22 | Resolved: 2024-10-23

## 2024-10-23 PROCEDURE — 3077F SYST BP >= 140 MM HG: CPT | Mod: CPTII,S$GLB,, | Performed by: OBSTETRICS & GYNECOLOGY

## 2024-10-23 PROCEDURE — 3008F BODY MASS INDEX DOCD: CPT | Mod: CPTII,S$GLB,, | Performed by: OBSTETRICS & GYNECOLOGY

## 2024-10-23 PROCEDURE — 76819 FETAL BIOPHYS PROFIL W/O NST: CPT | Mod: S$GLB,,, | Performed by: OBSTETRICS & GYNECOLOGY

## 2024-10-23 PROCEDURE — 76811 OB US DETAILED SNGL FETUS: CPT | Mod: S$GLB,,, | Performed by: OBSTETRICS & GYNECOLOGY

## 2024-10-23 PROCEDURE — 76820 UMBILICAL ARTERY ECHO: CPT | Mod: S$GLB,,, | Performed by: OBSTETRICS & GYNECOLOGY

## 2024-10-23 PROCEDURE — 1159F MED LIST DOCD IN RCRD: CPT | Mod: CPTII,S$GLB,, | Performed by: OBSTETRICS & GYNECOLOGY

## 2024-10-23 PROCEDURE — 1160F RVW MEDS BY RX/DR IN RCRD: CPT | Mod: CPTII,S$GLB,, | Performed by: OBSTETRICS & GYNECOLOGY

## 2024-10-23 PROCEDURE — 99214 OFFICE O/P EST MOD 30 MIN: CPT | Mod: TH,S$GLB,, | Performed by: OBSTETRICS & GYNECOLOGY

## 2024-10-23 PROCEDURE — 3080F DIAST BP >= 90 MM HG: CPT | Mod: CPTII,S$GLB,, | Performed by: OBSTETRICS & GYNECOLOGY

## 2024-10-23 RX ORDER — FERROUS SULFATE 325(65) MG
325 TABLET ORAL EVERY OTHER DAY
Qty: 20 TABLET | Refills: 3 | Status: SHIPPED | OUTPATIENT
Start: 2024-10-23

## 2024-10-23 RX ORDER — FOLIC ACID 1 MG/1
1 TABLET ORAL EVERY OTHER DAY
Qty: 20 TABLET | Refills: 3 | Status: SHIPPED | OUTPATIENT
Start: 2024-10-23

## 2024-10-23 RX ORDER — ASCORBIC ACID 250 MG
250 TABLET ORAL EVERY OTHER DAY
Qty: 20 TABLET | Refills: 3 | Status: SHIPPED | OUTPATIENT
Start: 2024-10-23

## 2024-10-24 DIAGNOSIS — O28.3 ABNORMAL PRENATAL ULTRASOUND: ICD-10-CM

## 2024-10-24 DIAGNOSIS — O36.5930 INTRAUTERINE GROWTH RETARDATION AFFECTING MOTHER, ANTEPARTUM, THIRD TRIMESTER, NOT APPLICABLE OR UNSPECIFIED FETUS: Primary | ICD-10-CM

## 2024-10-24 DIAGNOSIS — O10.013 PRE-EXISTING ESSENTIAL HYPERTENSION COMPLICATING PREGNANCY IN THIRD TRIMESTER: ICD-10-CM

## 2024-10-24 DIAGNOSIS — O99.013 ANEMIA DURING PREGNANCY IN THIRD TRIMESTER: ICD-10-CM

## 2024-10-24 DIAGNOSIS — E66.01 SEVERE OBESITY DUE TO EXCESS CALORIES AFFECTING PREGNANCY IN THIRD TRIMESTER: ICD-10-CM

## 2024-10-24 DIAGNOSIS — O99.213 SEVERE OBESITY DUE TO EXCESS CALORIES AFFECTING PREGNANCY IN THIRD TRIMESTER: ICD-10-CM

## 2024-10-29 DIAGNOSIS — O99.013 ANEMIA DURING PREGNANCY IN THIRD TRIMESTER: ICD-10-CM

## 2024-10-29 DIAGNOSIS — O36.5930 INTRAUTERINE GROWTH RETARDATION AFFECTING MOTHER, ANTEPARTUM, THIRD TRIMESTER, NOT APPLICABLE OR UNSPECIFIED FETUS: Primary | ICD-10-CM

## 2024-10-29 DIAGNOSIS — O10.013 PRE-EXISTING ESSENTIAL HYPERTENSION COMPLICATING PREGNANCY IN THIRD TRIMESTER: ICD-10-CM

## 2024-10-31 ENCOUNTER — PROCEDURE VISIT (OUTPATIENT)
Dept: MATERNAL FETAL MEDICINE | Facility: CLINIC | Age: 29
End: 2024-10-31
Payer: MEDICAID

## 2024-10-31 ENCOUNTER — OFFICE VISIT (OUTPATIENT)
Dept: MATERNAL FETAL MEDICINE | Facility: CLINIC | Age: 29
End: 2024-10-31
Payer: MEDICAID

## 2024-10-31 VITALS
HEART RATE: 91 BPM | WEIGHT: 238.81 LBS | BODY MASS INDEX: 43.94 KG/M2 | OXYGEN SATURATION: 99 % | SYSTOLIC BLOOD PRESSURE: 138 MMHG | DIASTOLIC BLOOD PRESSURE: 82 MMHG | HEIGHT: 62 IN

## 2024-10-31 DIAGNOSIS — O99.013 ANEMIA DURING PREGNANCY IN THIRD TRIMESTER: ICD-10-CM

## 2024-10-31 DIAGNOSIS — E66.01 SEVERE OBESITY DUE TO EXCESS CALORIES AFFECTING PREGNANCY IN THIRD TRIMESTER: ICD-10-CM

## 2024-10-31 DIAGNOSIS — O99.213 SEVERE OBESITY DUE TO EXCESS CALORIES AFFECTING PREGNANCY IN THIRD TRIMESTER: ICD-10-CM

## 2024-10-31 DIAGNOSIS — O36.5930 INTRAUTERINE GROWTH RETARDATION AFFECTING MOTHER, ANTEPARTUM, THIRD TRIMESTER, NOT APPLICABLE OR UNSPECIFIED FETUS: ICD-10-CM

## 2024-10-31 DIAGNOSIS — O28.3 ABNORMAL PRENATAL ULTRASOUND: ICD-10-CM

## 2024-10-31 DIAGNOSIS — O36.5931 LIGHT FOR GESTATIONAL DATES AFFECTING MANAGEMENT OF MOTHER, THIRD TRIMESTER, FETUS 1: Primary | ICD-10-CM

## 2024-10-31 DIAGNOSIS — O10.919 CHRONIC HYPERTENSION AFFECTING PREGNANCY: ICD-10-CM

## 2024-10-31 DIAGNOSIS — O10.013 PRE-EXISTING ESSENTIAL HYPERTENSION COMPLICATING PREGNANCY IN THIRD TRIMESTER: ICD-10-CM

## 2024-10-31 PROCEDURE — 99213 OFFICE O/P EST LOW 20 MIN: CPT | Mod: TH,S$GLB,, | Performed by: OBSTETRICS & GYNECOLOGY

## 2024-10-31 PROCEDURE — 3075F SYST BP GE 130 - 139MM HG: CPT | Mod: CPTII,S$GLB,, | Performed by: OBSTETRICS & GYNECOLOGY

## 2024-10-31 PROCEDURE — 76819 FETAL BIOPHYS PROFIL W/O NST: CPT | Mod: S$GLB,,, | Performed by: OBSTETRICS & GYNECOLOGY

## 2024-10-31 PROCEDURE — 3008F BODY MASS INDEX DOCD: CPT | Mod: CPTII,S$GLB,, | Performed by: OBSTETRICS & GYNECOLOGY

## 2024-10-31 PROCEDURE — 3079F DIAST BP 80-89 MM HG: CPT | Mod: CPTII,S$GLB,, | Performed by: OBSTETRICS & GYNECOLOGY

## 2024-10-31 PROCEDURE — 1160F RVW MEDS BY RX/DR IN RCRD: CPT | Mod: CPTII,S$GLB,, | Performed by: OBSTETRICS & GYNECOLOGY

## 2024-10-31 PROCEDURE — 1159F MED LIST DOCD IN RCRD: CPT | Mod: CPTII,S$GLB,, | Performed by: OBSTETRICS & GYNECOLOGY

## 2024-11-01 NOTE — PROGRESS NOTES
Maternal Fetal Medicine Follow Up    Subjective:     Patient ID: [id number]    Chief Complaint: MFM follow up with US      HPI: Radha Tian is a 28 y.o. female  at 35w3d gestation with Estimated Date of Delivery: 24  who is here for follow-up consultation by MFM.    Patient was seen in the hospital around 10/20 for labile blood pressure.  She received course of betamethasone on 10/20 and 10/21.  After review of records, noted well documented history of chronic hypertension, and she was started on labetalol 100 mg every 12 hours. She had 24 hour urine protein 331.5mg protein on 10/21 but no baseline for comparison. She was also noted to have suspected fetal growth restriction while in the hospital.  Fetal growth was low normal on MFM ultrasound on 10/23/2024.  Placenta was noted to be thickened.  She accepted cell free DNA which was ordered.  However, she reports today 2024 that she had a genetic test with her primary OB around 20 weeks that was negative.  She has iron deficiency anemia with H/H 9.0/28.4 on 10/20.  She received a dose of IV Feraheme 10/21.  She has elevated BMI of 43.9 on initial MFM consult visit.       Interval history since last MFM visit: None.. She denies any leaking fluid, vaginal bleeding, contractions, decreased fetal movement. Denies headaches, visual disturbances, or epigastric pain.    Pregnancy complications include:   Patient Active Problem List   Diagnosis    Chronic hypertension affecting pregnancy    Increased BMI over 40 affecting pregnancy in third trimester    Anemia during pregnancy in third trimester    Thickened placenta    Light for gestational dates affecting management of mother, third trimester, fetus 1 [O36.5931]       No changes to medical, surgical, family, social, or obstetric history.    Medications:  Current Outpatient Medications   Medication Instructions    ascorbic acid (vitamin C) (VITAMIN C) 250 mg, Oral, Every other day    ferrous sulfate  "(FEOSOL) 325 mg, Oral, Every other day    folic acid (FOLVITE) 1 mg, Oral, Every other day    labetaloL (NORMODYNE) 100 mg, Oral, Every 12 hours       Review of Systems   12 point review of systems conducted, negative except as stated in the history of present illness. See HPI for details.      Objective:     Visit Vitals  /86 (BP Location: Right arm, Patient Position: Sitting)   Pulse 93   Ht 5' 2" (1.575 m)   Wt 106.6 kg (235 lb)   BMI 42.98 kg/m²          Physical Exam  Vitals and nursing note reviewed.   Constitutional:       Appearance: Normal appearance.      Comments: Increased BMI   HENT:      Head: Normocephalic and atraumatic.      Nose: Nose normal. No congestion.   Cardiovascular:      Rate and Rhythm: Normal rate.   Pulmonary:      Effort: Pulmonary effort is normal.   Skin:     Findings: No rash.   Neurological:      Mental Status: She is alert and oriented to person, place, and time.   Psychiatric:         Mood and Affect: Mood normal.         Behavior: Behavior normal.         Thought Content: Thought content normal.         Judgment: Judgment normal.         Assessment/Plan:     28 y.o.  female with IUP at 35w3d    Chronic hypertension   There is low normal fetal growth with an EFW of 1977 g at the 13% and the AC at the 12% on 10/23/24  AFV is normal. Biophysical profile  today, 2024.  Normal umbilical artery Doppler today, 2024.    Chronic hypertension is associated with significant adverse pregnancy outcomes including  birth, fetal growth restriction, fetal demise, placental abruption, and  delivery. Based on findings of recent CHAP Study, it is recommended to utilize 140/90 as the threshold for initiation or titration of medical therapy for chronic hypertension in pregnancy, rather than the previously recommended threshold of 160/110. For patients on blood pressure medications at the start of pregnancy, in the absence of mitigating factors or side " effects, they can be maintained on their medications, rather than discontinuing them and waiting to initiate treatment for blood pressures in the severe range.    Vitals:    24 1034   BP: 118/86       With this BP, she was advised to continue low salt diet, and continue labetalol at 100 mg q.12h .     With chronic hypertension on medication and increased BMI over 40, recommend twice weekly fetal testing, alternating with primary OB, until delivery.    With chronic hypertension on medication, increased BMI, and low-normal fetal growth, recommend delivery at 38 weeks' gestation (38-38 6/7th weeks) as optimal balance between prematurity risks and risks of continued pregnancy. Earlier delivery may be needed for worsening maternal or fetal status.       Low-normal fetal growth  Although low-normal, previously discussed range of error of ultrasound and possibility for more significant fetal growth restriction especially with her history of hypertension.     She declined amniocentesis.  She accepted cell free DNA and order was given previously. However, she reports today 2024 that she had a genetic test with her primary OB around 20 weeks that was negative, requested report.  Reviewed need for  evaluation.    Complications of growth-restricted neonates include hypoglycemia, hyperbilirubinemia, hypothermia, seizures, sepsis, IVH, RDS, necrotizing enterocolitis, and  asphyxia. Long-term complications include cognitive delay and adult diseases such as cardiovascular disorders and type 2 diabetes. There is an increased risk (~20%) for recurrence of fetal growth restriction in a future pregnancy.    Fetal testing and delivery timing as above.      Iron-deficiency anemia   She was advised to continue oral hematinic therapy with ferrous sulfate 325 mg every other day, vitamin-C 250 mg every other day, and folic acid 1 mg every other day.  Recommend intermittent CBC throughout pregnancy to assess  response to therapy.      Elevated BMI  Body mass index is 42.98 kg/m². With relatively stable weight recently, she was advised to continue healthy low caloric and low salt diet. Excess weight gain would be associated with gestational hypertension, gestational diabetes and adverse  outcomes, including fetal demise in utero.    It is important to lose weight after the pregnancy is over, especially before a future pregnancy was discussed. Breastfeeding may be an important tool in reducing the postpartum weight retention. Fetal risks were discussed with short term risk of fetal/ obesity and long term risk of adolescent component of metabolic syndrome.      Thickened placenta  With thickened placenta, I discussed it could be associated with higher risk for FGR, aneuploidy, abruption, infection and hypertensive disease in pregnancy/preeclampsia. I discussed and offered genetic amniocentesis. She declined amniocentesis  and is aware of the need for  evaluation. Recommend monitoring fetal growth as above. Preeclampsia precautions given.     Follow up in about 1 week (around 2024) for M follow-up, Repeat ultrasound, BPP.     Future Appointments   Date Time Provider Department Center   2024  9:30 AM Mariela Dubose WHNP [facility]   2024  9:30 AM [facility]     Rosie Castillo PA-C     This note was created with the assistance of [employer] voice recognition software. There may be transcription errors as a result of using this technology, however minimal. Effort has been made to ensure accuracy of transcription, but any obvious errors or omissions should be clarified with the author of the document.

## 2024-11-04 ENCOUNTER — PROCEDURE VISIT (OUTPATIENT)
Dept: MATERNAL FETAL MEDICINE | Facility: CLINIC | Age: 29
End: 2024-11-04
Payer: MEDICAID

## 2024-11-04 ENCOUNTER — OFFICE VISIT (OUTPATIENT)
Dept: MATERNAL FETAL MEDICINE | Facility: CLINIC | Age: 29
End: 2024-11-04
Payer: MEDICAID

## 2024-11-04 VITALS
SYSTOLIC BLOOD PRESSURE: 118 MMHG | HEIGHT: 62 IN | DIASTOLIC BLOOD PRESSURE: 86 MMHG | HEART RATE: 93 BPM | BODY MASS INDEX: 43.24 KG/M2 | WEIGHT: 235 LBS

## 2024-11-04 DIAGNOSIS — O10.013 PRE-EXISTING ESSENTIAL HYPERTENSION COMPLICATING PREGNANCY IN THIRD TRIMESTER: ICD-10-CM

## 2024-11-04 DIAGNOSIS — O99.213 SEVERE OBESITY DUE TO EXCESS CALORIES AFFECTING PREGNANCY IN THIRD TRIMESTER: ICD-10-CM

## 2024-11-04 DIAGNOSIS — E66.01 SEVERE OBESITY DUE TO EXCESS CALORIES AFFECTING PREGNANCY IN THIRD TRIMESTER: ICD-10-CM

## 2024-11-04 DIAGNOSIS — O36.5931 LIGHT FOR GESTATIONAL DATES AFFECTING MANAGEMENT OF MOTHER, THIRD TRIMESTER, FETUS 1: ICD-10-CM

## 2024-11-04 DIAGNOSIS — O28.3 ABNORMAL PRENATAL ULTRASOUND: Primary | ICD-10-CM

## 2024-11-04 DIAGNOSIS — O36.5930 INTRAUTERINE GROWTH RETARDATION AFFECTING MOTHER, ANTEPARTUM, THIRD TRIMESTER, NOT APPLICABLE OR UNSPECIFIED FETUS: ICD-10-CM

## 2024-11-04 DIAGNOSIS — O99.013 ANEMIA DURING PREGNANCY IN THIRD TRIMESTER: ICD-10-CM

## 2024-11-04 DIAGNOSIS — O10.919 CHRONIC HYPERTENSION AFFECTING PREGNANCY: ICD-10-CM

## 2024-11-04 DIAGNOSIS — O28.3 ABNORMAL PRENATAL ULTRASOUND: ICD-10-CM

## 2024-11-04 DIAGNOSIS — O36.5930 INTRAUTERINE GROWTH RETARDATION AFFECTING MOTHER, ANTEPARTUM, THIRD TRIMESTER, NOT APPLICABLE OR UNSPECIFIED FETUS: Primary | ICD-10-CM

## 2024-11-04 PROCEDURE — 1160F RVW MEDS BY RX/DR IN RCRD: CPT | Mod: CPTII,S$GLB,,

## 2024-11-04 PROCEDURE — 76819 FETAL BIOPHYS PROFIL W/O NST: CPT | Mod: S$GLB,,, | Performed by: OBSTETRICS & GYNECOLOGY

## 2024-11-04 PROCEDURE — 3074F SYST BP LT 130 MM HG: CPT | Mod: CPTII,S$GLB,,

## 2024-11-04 PROCEDURE — 1159F MED LIST DOCD IN RCRD: CPT | Mod: CPTII,S$GLB,,

## 2024-11-04 PROCEDURE — 3079F DIAST BP 80-89 MM HG: CPT | Mod: CPTII,S$GLB,,

## 2024-11-04 PROCEDURE — 99213 OFFICE O/P EST LOW 20 MIN: CPT | Mod: TH,S$GLB,,

## 2024-11-04 PROCEDURE — 3008F BODY MASS INDEX DOCD: CPT | Mod: CPTII,S$GLB,,

## 2024-11-04 PROCEDURE — 76820 UMBILICAL ARTERY ECHO: CPT | Mod: S$GLB,,, | Performed by: OBSTETRICS & GYNECOLOGY

## 2024-11-08 NOTE — PROGRESS NOTES
Maternal Fetal Medicine Follow Up    Subjective:     Patient ID: [id number]    Chief Complaint: MFM follow up with US      HPI: Radha Tian is a 28 y.o. female  at 36w3d gestation with Estimated Date of Delivery: 24  who is here for follow-up consultation by MFM.    Patient has history of chronic hypertension.  She was on labetalol 100 mg every 12 hours.  She received course of betamethasone on 10/20 and 10/21 during hospital stay.  She had 24 hour urine protein 331.5mg protein on 10/21 but no baseline for comparison.  She had low normal fetal growth on MFM ultrasound on 10/23/2024.  She also had thick and placenta noted on previous ultrasound.  She reported negative genetic test early in the pregnancy with her primary OB.  We reviewed those records and appears quad screen was drawn too late so risks for trisomy 21, 13, and 18 were not reported; but the MSAFP was reported as normal.  She has iron deficiency anemia with H/H 9.0/28.4 on 10/20.  She received a dose of IV Feraheme 10/21.  She was prescribed oral hematinic therapy but not taking it as reported pharmacy did not receive.  She had elevated BMI of 43.9 on initial MFM consult visit.       Interval history since last MFM visit: None.. She denies any leaking fluid, vaginal bleeding, contractions, decreased fetal movement. Denies headaches, visual disturbances, or epigastric pain.    Pregnancy complications include:   Patient Active Problem List   Diagnosis    Chronic hypertension affecting pregnancy    Increased BMI over 40 affecting pregnancy in third trimester    Anemia during pregnancy in third trimester    Thickened placenta    Light for gestational dates affecting management of mother, third trimester, fetus 1 [O36.5931]       No changes to medical, surgical, family, social, or obstetric history.    Medications:  Current Outpatient Medications   Medication Instructions    ascorbic acid (vitamin C) (VITAMIN C) 250 mg, Oral, Every other day     "ferrous sulfate (FEOSOL) 325 mg, Oral, Every other day    folic acid (FOLVITE) 1 mg, Oral, Every other day    labetaloL (NORMODYNE) 100 mg, Oral, Every 12 hours       Review of Systems   12 point review of systems conducted, negative except as stated in the history of present illness. See HPI for details.      Objective:     Visit Vitals  /85 (BP Location: Right arm, Patient Position: Sitting)   Pulse 89   Ht 5' 2" (1.575 m)   Wt 107 kg (236 lb)   BMI 43.16 kg/m²            Physical Exam  Vitals and nursing note reviewed.   Constitutional:       Appearance: Normal appearance.   HENT:      Head: Normocephalic and atraumatic.   Cardiovascular:      Rate and Rhythm: Normal rate and regular rhythm.   Pulmonary:      Effort: Pulmonary effort is normal. No respiratory distress.      Breath sounds: Normal breath sounds.   Abdominal:      Palpations: Abdomen is soft.      Tenderness: There is no abdominal tenderness.   Musculoskeletal:      Right lower leg: No edema.      Left lower leg: No edema.   Neurological:      Mental Status: She is alert and oriented to person, place, and time.   Psychiatric:         Mood and Affect: Mood normal.         Behavior: Behavior normal.         Thought Content: Thought content normal.         Judgment: Judgment normal.         Assessment/Plan:     28 y.o.  female with IUP at 36w3d    Chronic hypertension   There is low normal fetal growth with an EFW of 2520 g at the 14% and the AC at the 15%  on 24  AFV is normal. Biophysical profile  today, 2024.  Normal umbilical artery Doppler today, 2024.    Chronic hypertension is associated with significant adverse pregnancy outcomes including  birth, fetal growth restriction, fetal demise, placental abruption, and  delivery. Based on findings of recent CHAP Study, it is recommended to utilize 140/90 as the threshold for initiation or titration of medical therapy for chronic hypertension in " pregnancy, rather than the previously recommended threshold of 160/110. For patients on blood pressure medications at the start of pregnancy, in the absence of mitigating factors or side effects, they can be maintained on their medications, rather than discontinuing them and waiting to initiate treatment for blood pressures in the severe range.    Vitals:    24 1026   BP: 112/85       Recommend low-dose aspirin starting at 12 weeks in any future pregnancy and continue until delivery.  Reviewed preeclampsia precautions.    With this BP, she was advised to continue low salt diet, and continue labetalol at 100 mg q.12h .     With chronic hypertension on medication and increased BMI over 40, recommend twice weekly fetal testing, alternating with primary OB, until delivery.  Reviewed fetal kick count instructions.    With chronic hypertension on medication, increased BMI, and low-normal fetal growth, recommend delivery at 38 weeks' gestation (38-38 6/7th weeks) as optimal balance between prematurity risks and risks of continued pregnancy. Earlier delivery may be needed for worsening maternal or fetal status.       Low-normal fetal growth  She was previously offered and declined amniocentesis.  With quad screen done too late, rediscussed option of cell free DNA. She is not interested in that at this time. She is aware of need for  evaluation.    Fetal testing and delivery timing as above.      Iron-deficiency anemia   She was encouraged to continue oral hematinic therapy with ferrous sulfate 325 mg every other day, vitamin-C 250 mg every other day, and folic acid 1 mg every other day.  Rx resent.  Recommend intermittent CBC throughout pregnancy to assess response to therapy.      Elevated BMI  Body mass index is 43.16 kg/m². With relatively stable weight recently, she was advised to continue healthy, low-sodium diet avoiding any excessive weight gain.  Excess weight gain would be associated with worsening  hypertension, gestational diabetes and adverse  outcomes, including fetal demise in utero.    Reviewed importance of FKC 3/day and prn with instructions to immediately report any decreased fetal movement.    It is important to lose weight after the pregnancy is over, especially before a future pregnancy. Breastfeeding may be an important tool in reducing the postpartum weight retention. Fetal risks were discussed with short term risk of fetal/ obesity and long term risk of adolescent component of metabolic syndrome.      Thickened placenta  With thickened placenta, previously discussed it could be associated with higher risk for FGR, aneuploidy, abruption, infection and hypertensive disease in pregnancy/preeclampsia.  She was offered and declined amniocentesis, and she was aware of the need for  evaluation.  Fetal surveillance as above.  Preeclampsia precautions reviewed.     Follow up in about 1 week (around 2024) for MFM Followup, BPP.     No future appointments.    FELA Klein     This note was created with the assistance of [employer] voice recognition software. There may be transcription errors as a result of using this technology, however minimal. Effort has been made to ensure accuracy of transcription, but any obvious errors or omissions should be clarified with the author of the document.

## 2024-11-11 ENCOUNTER — PROCEDURE VISIT (OUTPATIENT)
Dept: MATERNAL FETAL MEDICINE | Facility: CLINIC | Age: 29
End: 2024-11-11
Payer: MEDICAID

## 2024-11-11 ENCOUNTER — OFFICE VISIT (OUTPATIENT)
Dept: MATERNAL FETAL MEDICINE | Facility: CLINIC | Age: 29
End: 2024-11-11
Payer: MEDICAID

## 2024-11-11 VITALS
SYSTOLIC BLOOD PRESSURE: 112 MMHG | HEART RATE: 89 BPM | HEIGHT: 62 IN | BODY MASS INDEX: 43.43 KG/M2 | WEIGHT: 236 LBS | DIASTOLIC BLOOD PRESSURE: 85 MMHG

## 2024-11-11 DIAGNOSIS — O99.213 SEVERE OBESITY DUE TO EXCESS CALORIES AFFECTING PREGNANCY IN THIRD TRIMESTER: ICD-10-CM

## 2024-11-11 DIAGNOSIS — O99.013 ANEMIA DURING PREGNANCY IN THIRD TRIMESTER: ICD-10-CM

## 2024-11-11 DIAGNOSIS — O10.013 PRE-EXISTING ESSENTIAL HYPERTENSION COMPLICATING PREGNANCY IN THIRD TRIMESTER: ICD-10-CM

## 2024-11-11 DIAGNOSIS — O10.919 CHRONIC HYPERTENSION AFFECTING PREGNANCY: Primary | ICD-10-CM

## 2024-11-11 DIAGNOSIS — E66.01 SEVERE OBESITY DUE TO EXCESS CALORIES AFFECTING PREGNANCY IN THIRD TRIMESTER: ICD-10-CM

## 2024-11-11 DIAGNOSIS — O99.210 OBESITY AFFECTING PREGNANCY: ICD-10-CM

## 2024-11-11 DIAGNOSIS — O36.5931 LIGHT FOR GESTATIONAL DATES AFFECTING MANAGEMENT OF MOTHER, THIRD TRIMESTER, FETUS 1: ICD-10-CM

## 2024-11-11 DIAGNOSIS — O36.5930 INTRAUTERINE GROWTH RETARDATION AFFECTING MOTHER, ANTEPARTUM, THIRD TRIMESTER, NOT APPLICABLE OR UNSPECIFIED FETUS: ICD-10-CM

## 2024-11-11 DIAGNOSIS — O28.3 ABNORMAL PRENATAL ULTRASOUND: ICD-10-CM

## 2024-11-11 PROCEDURE — 1160F RVW MEDS BY RX/DR IN RCRD: CPT | Mod: CPTII,S$GLB,, | Performed by: NURSE PRACTITIONER

## 2024-11-11 PROCEDURE — 3079F DIAST BP 80-89 MM HG: CPT | Mod: CPTII,S$GLB,, | Performed by: NURSE PRACTITIONER

## 2024-11-11 PROCEDURE — 3008F BODY MASS INDEX DOCD: CPT | Mod: CPTII,S$GLB,, | Performed by: NURSE PRACTITIONER

## 2024-11-11 PROCEDURE — 99213 OFFICE O/P EST LOW 20 MIN: CPT | Mod: TH,S$GLB,, | Performed by: NURSE PRACTITIONER

## 2024-11-11 PROCEDURE — 1159F MED LIST DOCD IN RCRD: CPT | Mod: CPTII,S$GLB,, | Performed by: NURSE PRACTITIONER

## 2024-11-11 PROCEDURE — 76820 UMBILICAL ARTERY ECHO: CPT | Mod: S$GLB,,, | Performed by: OBSTETRICS & GYNECOLOGY

## 2024-11-11 PROCEDURE — 76816 OB US FOLLOW-UP PER FETUS: CPT | Mod: S$GLB,,, | Performed by: OBSTETRICS & GYNECOLOGY

## 2024-11-11 PROCEDURE — 3074F SYST BP LT 130 MM HG: CPT | Mod: CPTII,S$GLB,, | Performed by: NURSE PRACTITIONER

## 2024-11-11 PROCEDURE — 76819 FETAL BIOPHYS PROFIL W/O NST: CPT | Mod: S$GLB,,, | Performed by: OBSTETRICS & GYNECOLOGY

## 2024-11-11 RX ORDER — FOLIC ACID 1 MG/1
1 TABLET ORAL EVERY OTHER DAY
Qty: 20 TABLET | Refills: 3 | Status: ON HOLD | OUTPATIENT
Start: 2024-11-11 | End: 2024-11-14

## 2024-11-11 RX ORDER — ASCORBIC ACID 250 MG
250 TABLET ORAL EVERY OTHER DAY
Qty: 20 TABLET | Refills: 3 | Status: ON HOLD | OUTPATIENT
Start: 2024-11-11 | End: 2024-11-14

## 2024-11-11 RX ORDER — FERROUS SULFATE 325(65) MG
325 TABLET ORAL EVERY OTHER DAY
Qty: 20 TABLET | Refills: 3 | Status: ON HOLD | OUTPATIENT
Start: 2024-11-11 | End: 2024-11-14

## 2024-11-13 ENCOUNTER — HOSPITAL ENCOUNTER (OUTPATIENT)
Facility: HOSPITAL | Age: 29
Discharge: HOME | End: 2024-11-14
Attending: OBSTETRICS & GYNECOLOGY | Admitting: OBSTETRICS & GYNECOLOGY
Payer: COMMERCIAL

## 2024-11-13 DIAGNOSIS — Z3A.36 36 WEEKS GESTATION OF PREGNANCY: Primary | ICD-10-CM

## 2024-11-13 DIAGNOSIS — I10 CHRONIC HYPERTENSION: ICD-10-CM

## 2024-11-13 PROBLEM — Z36.89 NST (NON-STRESS TEST) REACTIVE ON FETAL SURVEILLANCE: Status: ACTIVE | Noted: 2024-10-22

## 2024-11-13 LAB
ALBUMIN SERPL-MCNC: 2.9 G/DL (ref 3.5–5)
ALBUMIN/GLOB SERPL: 0.8 RATIO (ref 1.1–2)
ALP SERPL-CCNC: 150 UNIT/L (ref 40–150)
ALT SERPL-CCNC: 7 UNIT/L (ref 0–55)
ANION GAP SERPL CALC-SCNC: 8 MEQ/L
AST SERPL-CCNC: 15 UNIT/L (ref 5–34)
BASOPHILS # BLD AUTO: 0.04 X10(3)/MCL
BASOPHILS NFR BLD AUTO: 0.5 %
BILIRUB SERPL-MCNC: 0.3 MG/DL
BUN SERPL-MCNC: 4.6 MG/DL (ref 7–18.7)
CALCIUM SERPL-MCNC: 9 MG/DL (ref 8.4–10.2)
CHLORIDE SERPL-SCNC: 106 MMOL/L (ref 98–107)
CO2 SERPL-SCNC: 21 MMOL/L (ref 22–29)
CREAT SERPL-MCNC: 0.63 MG/DL (ref 0.55–1.02)
CREAT/UREA NIT SERPL: 7
EOSINOPHIL # BLD AUTO: 0.09 X10(3)/MCL (ref 0–0.9)
EOSINOPHIL NFR BLD AUTO: 1.1 %
ERYTHROCYTE [DISTWIDTH] IN BLOOD BY AUTOMATED COUNT: 17.9 % (ref 11.5–17)
GFR SERPLBLD CREATININE-BSD FMLA CKD-EPI: >60 ML/MIN/1.73/M2
GLOBULIN SER-MCNC: 3.6 GM/DL (ref 2.4–3.5)
GLUCOSE SERPL-MCNC: 76 MG/DL (ref 74–100)
HCT VFR BLD AUTO: 31.4 % (ref 37–47)
HGB BLD-MCNC: 10 G/DL (ref 12–16)
IMM GRANULOCYTES # BLD AUTO: 0.06 X10(3)/MCL (ref 0–0.04)
IMM GRANULOCYTES NFR BLD AUTO: 0.7 %
LDH SERPL-CCNC: 162 U/L (ref 125–220)
LYMPHOCYTES # BLD AUTO: 1.33 X10(3)/MCL (ref 0.6–4.6)
LYMPHOCYTES NFR BLD AUTO: 16.1 %
MCH RBC QN AUTO: 27.8 PG (ref 27–31)
MCHC RBC AUTO-ENTMCNC: 31.8 G/DL (ref 33–36)
MCV RBC AUTO: 87.2 FL (ref 80–94)
MONOCYTES # BLD AUTO: 0.72 X10(3)/MCL (ref 0.1–1.3)
MONOCYTES NFR BLD AUTO: 8.7 %
NEUTROPHILS # BLD AUTO: 6.03 X10(3)/MCL (ref 2.1–9.2)
NEUTROPHILS NFR BLD AUTO: 72.9 %
NRBC BLD AUTO-RTO: 0 %
PLATELET # BLD AUTO: 207 X10(3)/MCL (ref 130–400)
PMV BLD AUTO: 10.3 FL (ref 7.4–10.4)
POTASSIUM SERPL-SCNC: 3.8 MMOL/L (ref 3.5–5.1)
PRENATAL STREP B CULTURE: NEGATIVE
PROT SERPL-MCNC: 6.5 GM/DL (ref 6.4–8.3)
RBC # BLD AUTO: 3.6 X10(6)/MCL (ref 4.2–5.4)
SODIUM SERPL-SCNC: 135 MMOL/L (ref 136–145)
URATE SERPL-MCNC: 5.6 MG/DL (ref 2.6–6)
WBC # BLD AUTO: 8.27 X10(3)/MCL (ref 4.5–11.5)

## 2024-11-13 PROCEDURE — G0379 DIRECT REFER HOSPITAL OBSERV: HCPCS

## 2024-11-13 PROCEDURE — 25000003 PHARM REV CODE 250: Performed by: NURSE PRACTITIONER

## 2024-11-13 PROCEDURE — 80053 COMPREHEN METABOLIC PANEL: CPT | Performed by: OBSTETRICS & GYNECOLOGY

## 2024-11-13 PROCEDURE — 85025 COMPLETE CBC W/AUTO DIFF WBC: CPT | Performed by: OBSTETRICS & GYNECOLOGY

## 2024-11-13 PROCEDURE — G0378 HOSPITAL OBSERVATION PER HR: HCPCS

## 2024-11-13 PROCEDURE — 84550 ASSAY OF BLOOD/URIC ACID: CPT | Performed by: OBSTETRICS & GYNECOLOGY

## 2024-11-13 PROCEDURE — 83615 LACTATE (LD) (LDH) ENZYME: CPT | Performed by: OBSTETRICS & GYNECOLOGY

## 2024-11-13 PROCEDURE — 59025 FETAL NON-STRESS TEST: CPT

## 2024-11-13 RX ORDER — LABETALOL HCL 20 MG/4 ML
40 SYRINGE (ML) INTRAVENOUS ONCE AS NEEDED
Status: DISCONTINUED | OUTPATIENT
Start: 2024-11-13 | End: 2024-11-14 | Stop reason: HOSPADM

## 2024-11-13 RX ORDER — LABETALOL HCL 20 MG/4 ML
80 SYRINGE (ML) INTRAVENOUS ONCE AS NEEDED
Status: DISCONTINUED | OUTPATIENT
Start: 2024-11-13 | End: 2024-11-14 | Stop reason: HOSPADM

## 2024-11-13 RX ORDER — LABETALOL 200 MG/1
200 TABLET, FILM COATED ORAL EVERY 12 HOURS
Status: DISCONTINUED | OUTPATIENT
Start: 2024-11-13 | End: 2024-11-14 | Stop reason: HOSPADM

## 2024-11-13 RX ORDER — LABETALOL HCL 20 MG/4 ML
20 SYRINGE (ML) INTRAVENOUS ONCE AS NEEDED
Status: DISCONTINUED | OUTPATIENT
Start: 2024-11-13 | End: 2024-11-14 | Stop reason: HOSPADM

## 2024-11-13 RX ORDER — HYDRALAZINE HYDROCHLORIDE 20 MG/ML
10 INJECTION INTRAMUSCULAR; INTRAVENOUS ONCE AS NEEDED
Status: DISCONTINUED | OUTPATIENT
Start: 2024-11-13 | End: 2024-11-14 | Stop reason: HOSPADM

## 2024-11-13 RX ADMIN — LABETALOL HYDROCHLORIDE 200 MG: 200 TABLET, FILM COATED ORAL at 05:11

## 2024-11-14 VITALS
SYSTOLIC BLOOD PRESSURE: 139 MMHG | OXYGEN SATURATION: 98 % | TEMPERATURE: 98 F | DIASTOLIC BLOOD PRESSURE: 80 MMHG | RESPIRATION RATE: 18 BRPM | HEART RATE: 83 BPM

## 2024-11-14 PROBLEM — Z3A.36 36 WEEKS GESTATION OF PREGNANCY: Status: ACTIVE | Noted: 2024-11-14

## 2024-11-14 PROCEDURE — G0378 HOSPITAL OBSERVATION PER HR: HCPCS

## 2024-11-14 PROCEDURE — 25000003 PHARM REV CODE 250: Performed by: OBSTETRICS & GYNECOLOGY

## 2024-11-14 PROCEDURE — 25000003 PHARM REV CODE 250: Performed by: NURSE PRACTITIONER

## 2024-11-14 RX ORDER — LABETALOL 200 MG/1
200 TABLET, FILM COATED ORAL EVERY 12 HOURS
Qty: 60 TABLET | Refills: 2 | OUTPATIENT
Start: 2024-11-14 | End: 2024-11-14

## 2024-11-14 RX ORDER — ACETAMINOPHEN 325 MG/1
650 TABLET ORAL EVERY 4 HOURS PRN
Status: DISCONTINUED | OUTPATIENT
Start: 2024-11-14 | End: 2024-11-14 | Stop reason: HOSPADM

## 2024-11-14 RX ADMIN — LABETALOL HYDROCHLORIDE 200 MG: 200 TABLET, FILM COATED ORAL at 08:11

## 2024-11-14 RX ADMIN — ACETAMINOPHEN 650 MG: 325 TABLET, FILM COATED ORAL at 01:11

## 2024-11-14 NOTE — PROGRESS NOTES
Labor precautions given. Pt notified she is schedule for induction Friday November 22nd at 0730. Someone from the hospital will call her with further instructions on Thursday afternoon. Pt will follow up with Dr. Ferris on Monday and Dr. Denney on Tuesday. Reminded to  RX for new Labetalol rx. Pt verbalizes understanding.

## 2024-11-14 NOTE — PLAN OF CARE
Problem: Adult Inpatient Plan of Care  Goal: Plan of Care Review  Outcome: Progressing  Goal: Patient-Specific Goal (Individualized)  Outcome: Progressing  Goal: Absence of Hospital-Acquired Illness or Injury  Outcome: Progressing  Goal: Optimal Comfort and Wellbeing  Outcome: Progressing  Goal: Readiness for Transition of Care  Outcome: Progressing     Problem: Hypertensive Disorders in Pregnancy  Goal: Patient-Fetal Stabilization  Outcome: Progressing

## 2024-11-14 NOTE — CONSULTS
Maternal Fetal Medicine Consult Note    Radha Tian is a 28 y.o.  female  at 36w5d  gestation who was admitted to the hospital for elevated BP. She was sent in for NST yesterday evening, and noted to have elevated BP. She is known patient to [facility], followed for CHTN, low normal fetal growth (EFW of 2520 g at the 14% and the AC at the 15%  on 24), anemia, and thickened placenta. She was on labetalol prior to admission that was increased to 200 mg every 12 hours. She had labs on admit. Currently, she denies leaking of fluid, vaginal bleeding, contractions, or decreased fetal movement. She also denies headaches, visual disturbances, or epigastric pain.    Review of Systems   12 point review of systems conducted, negative except as stated in the history of present illness. See HPI for details.    Past Medical History:   Diagnosis Date    Gestational HTN         Past Surgical History:   Procedure Laterality Date    TONSILLECTOMY                Family History   Problem Relation Name Age of Onset    No Known Problems Paternal Grandfather      No Known Problems Paternal Grandmother      No Known Problems Maternal Grandmother      No Known Problems Maternal Grandfather      No Known Problems Father       labor Mother      Hypertension Mother          Temp:  [98.2 °F (36.8 °C)] 98.2 °F (36.8 °C)  Pulse:  [76-88] 88  Resp:  [16] 16  SpO2:  [99 %] 99 %  BP: (140-156)/(79-98) 156/79    Physical Exam  Vitals and nursing note reviewed.   Constitutional:       Appearance: Normal appearance.      Comments: Increased BMI   HENT:      Head: Normocephalic and atraumatic.      Nose: Nose normal. No congestion.   Cardiovascular:      Rate and Rhythm: Normal rate.   Pulmonary:      Effort: Pulmonary effort is normal.   Skin:     Findings: No rash.   Neurological:      Mental Status: She is alert and oriented to person, place, and time.   Psychiatric:         Mood and Affect: Mood normal.         Behavior: Behavior  normal.         Thought Content: Thought content normal.         Judgment: Judgment normal.          Recent Results (from the past 48 hours)   Comprehensive Metabolic Panel    Collection Time: 11/13/24  4:07 PM   Result Value Ref Range    Sodium 135 (L) 136 - 145 mmol/L    Potassium 3.8 3.5 - 5.1 mmol/L    Chloride 106 98 - 107 mmol/L    CO2 21 (L) 22 - 29 mmol/L    Glucose 76 74 - 100 mg/dL    Blood Urea Nitrogen 4.6 (L) 7.0 - 18.7 mg/dL    Creatinine 0.63 0.55 - 1.02 mg/dL    Calcium 9.0 8.4 - 10.2 mg/dL    Protein Total 6.5 6.4 - 8.3 gm/dL    Albumin 2.9 (L) 3.5 - 5.0 g/dL    Globulin 3.6 (H) 2.4 - 3.5 gm/dL    Albumin/Globulin Ratio 0.8 (L) 1.1 - 2.0 ratio    Bilirubin Total 0.3 <=1.5 mg/dL     40 - 150 unit/L    ALT 7 0 - 55 unit/L    AST 15 5 - 34 unit/L    eGFR >60 mL/min/1.73/m2    Anion Gap 8.0 mEq/L    BUN/Creatinine Ratio 7    Lactate Dehydrogenase    Collection Time: 11/13/24  4:07 PM   Result Value Ref Range    Lactate Dehydrogenase 162 125 - 220 U/L   Uric Acid    Collection Time: 11/13/24  4:07 PM   Result Value Ref Range    Uric Acid 5.6 2.6 - 6.0 mg/dL   CBC with Differential    Collection Time: 11/13/24  4:07 PM   Result Value Ref Range    WBC 8.27 4.50 - 11.50 x10(3)/mcL    RBC 3.60 (L) 4.20 - 5.40 x10(6)/mcL    Hgb 10.0 (L) 12.0 - 16.0 g/dL    Hct 31.4 (L) 37.0 - 47.0 %    MCV 87.2 80.0 - 94.0 fL    MCH 27.8 27.0 - 31.0 pg    MCHC 31.8 (L) 33.0 - 36.0 g/dL    RDW 17.9 (H) 11.5 - 17.0 %    Platelet 207 130 - 400 x10(3)/mcL    MPV 10.3 7.4 - 10.4 fL    Neut % 72.9 %    Lymph % 16.1 %    Mono % 8.7 %    Eos % 1.1 %    Basophil % 0.5 %    Lymph # 1.33 0.6 - 4.6 x10(3)/mcL    Neut # 6.03 2.1 - 9.2 x10(3)/mcL    Mono # 0.72 0.1 - 1.3 x10(3)/mcL    Eos # 0.09 0 - 0.9 x10(3)/mcL    Baso # 0.04 <=0.2 x10(3)/mcL    IG# 0.06 (H) 0 - 0.04 x10(3)/mcL    IG% 0.7 %    NRBC% 0.0 %        US Doppler Umbilical Artery  Narrative: EXAMINATION:  US OB LIMITED 1 OR MORE GESTATIONS; US DOPP UMBILICAL  ARTERY    CLINICAL HISTORY:  Unspecified maternal hypertension, unspecified trimester    COMPARISON:  No priors    FINDINGS:  There is a single intrauterine gestation demonstrated in vertex presentation. Fetal heart rate was 151 BPM.  Posterior placenta.  Detailed fetal anatomic survey was not performed.  Doppler evaluation of the umbilical artery demonstrates good diastolic flow.  PS/ED ratio 2.4.  The RICKY is 11 cm.    Average ultrasound age is 33 weeks 4 days.    BPD = 8.6 cm-34 weeks 3 days.    HC = 31.0 cm-34 weeks 5 days.    AC = 27.4 cm-31 weeks 3 days.    FL = 6.6 cm-33 weeks 6 days.    Estimated fetal weight-2038 grams +/-306 grams (4 pounds 8 ounces +/-11 ounces).  This is 36th percentile.  Impression: Single intrauterine gestation with average ultrasound age of 33 weeks 4 days as described.    Electronically signed by: Noble Garibay  Date:    10/20/2024  Time:    08:45  US OB Limited 1 Or More Gestations  Narrative: EXAMINATION:  US OB LIMITED 1 OR MORE GESTATIONS; US DOPP UMBILICAL ARTERY    CLINICAL HISTORY:  Unspecified maternal hypertension, unspecified trimester    COMPARISON:  No priors    FINDINGS:  There is a single intrauterine gestation demonstrated in vertex presentation. Fetal heart rate was 151 BPM.  Posterior placenta.  Detailed fetal anatomic survey was not performed.  Doppler evaluation of the umbilical artery demonstrates good diastolic flow.  PS/ED ratio 2.4.  The RICKY is 11 cm.    Average ultrasound age is 33 weeks 4 days.    BPD = 8.6 cm-34 weeks 3 days.    HC = 31.0 cm-34 weeks 5 days.    AC = 27.4 cm-31 weeks 3 days.    FL = 6.6 cm-33 weeks 6 days.    Estimated fetal weight-2038 grams +/-306 grams (4 pounds 8 ounces +/-11 ounces).  This is 36th percentile.  Impression: Single intrauterine gestation with average ultrasound age of 33 weeks 4 days as described.    Electronically signed by: Noble Garibay  Date:    10/20/2024  Time:    08:45      Assessment/Plan    36w5d with:    ORACIO that  was not well controlled yesterday but improved today  FHT reassuring with a reactive tracing   BP's reviewed   Blood pressure appears to be stabilized after adjusting the dose of labetalol from 100 twice a day to 200 mg twice a day.  Patient could go home on the same dose of medication      Anemia   S/p IV iron. Continue oral hematinic therapy    VTE prophylaxis  Frequent movement of the extremities we will be continued at home.      This note was created with the assistance of [employer] voice recognition software. There may be transcription errors as a result of using this technology, however minimal. Effort has been made to assure accuracy of transcription, but any obvious errors or omissions should be clarified with the author of the document.        Patient was evaluated and examined by Dr. Ferris. FELA Rehman, helped in pre charting of part of note.

## 2024-11-17 PROBLEM — Z36.89 NST (NON-STRESS TEST) REACTIVE ON FETAL SURVEILLANCE: Status: RESOLVED | Noted: 2024-10-22 | Resolved: 2024-11-17

## 2024-11-17 NOTE — PROGRESS NOTES
Maternal Fetal Medicine Follow Up    Subjective:     Patient ID: [id number]    Chief Complaint: mfm followup w/us      HPI: Radha Tian is a 28 y.o. female  at 37w3d gestation with Estimated Date of Delivery: 24  who is here for follow-up consultation by MFM.    Patient has history of chronic hypertension.  She is on labetalol 200 mg every 12 hours, which was increased 24 after noted elevated BP in hospital during NST. Repeat labs on 24 were as follows: plt 207K, creatinine 0.63 AST 15. ALT 7, uric acid 5.6, . She received course of betamethasone on 10/20 and 10/21.  She had 24 hour urine protein 331.5mg protein on 10/21 but no baseline for comparison.  She had low normal fetal growth on MFM ultrasound on 10/23/2024.  She also had thickened placenta noted on previous ultrasound.  She declined any additional genetic testing. She has iron deficiency anemia with H/H 9.0/28.4 on 10/20.  She received a dose of IV Feraheme 10/21.She is on oral hematinic therapy.  She had elevated BMI of 43.9 on initial MFM consult visit.       Interval history since last MFM visit: None.. She denies any leaking fluid, vaginal bleeding, contractions, decreased fetal movement. Denies headaches, visual disturbances, or epigastric pain.    Pregnancy complications include:   Patient Active Problem List   Diagnosis    Chronic hypertension affecting pregnancy    Increased BMI over 40 affecting pregnancy in third trimester    Anemia during pregnancy in third trimester    Thickened placenta    Light for gestational dates affecting management of mother, third trimester, fetus 1 [O36.5931]       No changes to medical, surgical, family, social, or obstetric history.    Medications:  Current Outpatient Medications   Medication Instructions    ascorbic acid (vitamin C) (VITAMIN C) 250 mg, Daily    ferrous sulfate (FEOSOL) 325 mg, With breakfast    labetaloL (NORMODYNE) 200 mg, 2 times daily         Review of Systems  "  12 point review of systems conducted, negative except as stated in the history of present illness. See HPI for details.      Objective:     Visit Vitals  /88 (BP Location: Right arm, Patient Position: Sitting)   Pulse 89   Ht 5' 2" (1.575 m)   Wt 108.7 kg (239 lb 9.6 oz)   BMI 43.82 kg/m²              Physical Exam  Vitals and nursing note reviewed.   Constitutional:       Appearance: Normal appearance.   HENT:      Head: Normocephalic and atraumatic.   Cardiovascular:      Rate and Rhythm: Normal rate and regular rhythm.   Pulmonary:      Effort: Pulmonary effort is normal. No respiratory distress.      Breath sounds: Normal breath sounds.   Abdominal:      Palpations: Abdomen is soft.      Tenderness: There is no abdominal tenderness.   Musculoskeletal:      Right lower leg: No edema.      Left lower leg: No edema.   Neurological:      Mental Status: She is alert and oriented to person, place, and time.   Psychiatric:         Mood and Affect: Mood normal.         Behavior: Behavior normal.         Thought Content: Thought content normal.         Judgment: Judgment normal.         Assessment/Plan:     28 y.o.  female with IUP at 37w3d    Chronic hypertension   There was low normal fetal growth with an EFW of 2520 g at the 14% and the AC at the 15%  on 24  AFV is normal. Biophysical profile  today, 2024.      Chronic hypertension is associated with significant adverse pregnancy outcomes including  birth, fetal growth restriction, fetal demise, placental abruption, and  delivery. Based on findings of recent CHAP Study, it is recommended to utilize 140/90 as the threshold for initiation or titration of medical therapy for chronic hypertension in pregnancy, rather than the previously recommended threshold of 160/110. For patients on blood pressure medications at the start of pregnancy, in the absence of mitigating factors or side effects, they can be maintained on their " medications, rather than discontinuing them and waiting to initiate treatment for blood pressures in the severe range.    Vitals:    24 1325   BP: 137/88     With this BP, she was advised to continue low salt diet, and continue labetalol at 200 mg q.12h .     Recommend low-dose aspirin starting at 12 weeks in any future pregnancy and continue until delivery.  Reviewed preeclampsia precautions.    With chronic hypertension on medication and increased BMI over 40, recommend continue twice weekly fetal testing until delivery.  Reviewed fetal kick count instructions.    With chronic hypertension on medication, increased BMI, and low-normal fetal growth, recommend delivery at 38 weeks' gestation (38-38 6/7th weeks) as optimal balance between prematurity risks and risks of continued pregnancy. Earlier delivery may be needed for worsening maternal or fetal status.       Low-normal fetal growth  She was previously offered and declined amniocentesis and cfDNA. She is aware of need for  evaluation.    Fetal testing and delivery timing as above.      Iron-deficiency anemia   She was encouraged to continue oral hematinic therapy with ferrous sulfate 325 mg every other day, vitamin-C 250 mg every other day, and folic acid 1 mg every other day. Recommend intermittent CBC throughout pregnancy to assess response to therapy.      Elevated BMI  Body mass index is 43.82 kg/m². With relatively stable weight recently, she was advised to continue healthy, low-sodium diet avoiding any excessive weight gain.  Excess weight gain would be associated with worsening hypertension, gestational diabetes and adverse  outcomes, including fetal demise in utero.    Reviewed importance of FKC 3/day and prn with instructions to immediately report any decreased fetal movement.    It is important to lose weight after the pregnancy is over, especially before a future pregnancy. Breastfeeding may be an important tool in reducing the  postpartum weight retention. Fetal risks were discussed with short term risk of fetal/ obesity and long term risk of adolescent component of metabolic syndrome.      Thickened placenta  With thickened placenta, previously discussed it could be associated with higher risk for FGR, aneuploidy, abruption, infection and hypertensive disease in pregnancy/preeclampsia.  She was offered and declined amniocentesis, and she is aware of the need for  evaluation.  Fetal surveillance as above.  Preeclampsia precautions reviewed.     Fetal testing is reassuring.  Blood pressure is stable.  Patient was scheduled to deliver on Friday.  Fetal kick count instructions and preeclampsia warnings.    Follow up for No follow up scheduled.  Keep F/U with primary OB.     Future Appointments   Date Time Provider Department Center   2024  7:30 AM INDUCTION, [facility]     Patient was evaluated and examined by Dr. Ferris. FELA Rehman, helped in pre charting of part of note.      This note was created with the assistance of [employer] voice recognition software. There may be transcription errors as a result of using this technology, however minimal. Effort has been made to ensure accuracy of transcription, but any obvious errors or omissions should be clarified with the author of the document.

## 2024-11-18 ENCOUNTER — PROCEDURE VISIT (OUTPATIENT)
Dept: MATERNAL FETAL MEDICINE | Facility: CLINIC | Age: 29
End: 2024-11-18
Payer: MEDICAID

## 2024-11-18 ENCOUNTER — OFFICE VISIT (OUTPATIENT)
Dept: MATERNAL FETAL MEDICINE | Facility: CLINIC | Age: 29
End: 2024-11-18
Payer: MEDICAID

## 2024-11-18 VITALS
WEIGHT: 239.63 LBS | DIASTOLIC BLOOD PRESSURE: 88 MMHG | HEIGHT: 62 IN | SYSTOLIC BLOOD PRESSURE: 137 MMHG | BODY MASS INDEX: 44.1 KG/M2 | HEART RATE: 89 BPM

## 2024-11-18 DIAGNOSIS — O28.3 ABNORMAL PRENATAL ULTRASOUND: ICD-10-CM

## 2024-11-18 DIAGNOSIS — O99.013 ANEMIA DURING PREGNANCY IN THIRD TRIMESTER: ICD-10-CM

## 2024-11-18 DIAGNOSIS — O10.919 CHRONIC HYPERTENSION AFFECTING PREGNANCY: ICD-10-CM

## 2024-11-18 DIAGNOSIS — O99.210 OBESITY AFFECTING PREGNANCY: ICD-10-CM

## 2024-11-18 DIAGNOSIS — O36.5931 LIGHT FOR GESTATIONAL DATES AFFECTING MANAGEMENT OF MOTHER, THIRD TRIMESTER, FETUS 1: ICD-10-CM

## 2024-11-18 DIAGNOSIS — O10.919 CHRONIC HYPERTENSION AFFECTING PREGNANCY: Primary | ICD-10-CM

## 2024-11-18 DIAGNOSIS — E66.01 SEVERE OBESITY DUE TO EXCESS CALORIES AFFECTING PREGNANCY IN THIRD TRIMESTER: ICD-10-CM

## 2024-11-18 DIAGNOSIS — O99.213 SEVERE OBESITY DUE TO EXCESS CALORIES AFFECTING PREGNANCY IN THIRD TRIMESTER: ICD-10-CM

## 2024-11-18 PROBLEM — Z3A.36 36 WEEKS GESTATION OF PREGNANCY: Status: RESOLVED | Noted: 2024-11-14 | Resolved: 2024-11-18

## 2024-11-18 PROCEDURE — 1159F MED LIST DOCD IN RCRD: CPT | Mod: CPTII,S$GLB,, | Performed by: OBSTETRICS & GYNECOLOGY

## 2024-11-18 PROCEDURE — 3008F BODY MASS INDEX DOCD: CPT | Mod: CPTII,S$GLB,, | Performed by: OBSTETRICS & GYNECOLOGY

## 2024-11-18 PROCEDURE — 76819 FETAL BIOPHYS PROFIL W/O NST: CPT | Mod: S$GLB,,, | Performed by: OBSTETRICS & GYNECOLOGY

## 2024-11-18 PROCEDURE — 1111F DSCHRG MED/CURRENT MED MERGE: CPT | Mod: CPTII,S$GLB,, | Performed by: OBSTETRICS & GYNECOLOGY

## 2024-11-18 PROCEDURE — 99213 OFFICE O/P EST LOW 20 MIN: CPT | Mod: TH,S$GLB,, | Performed by: OBSTETRICS & GYNECOLOGY

## 2024-11-18 PROCEDURE — 3075F SYST BP GE 130 - 139MM HG: CPT | Mod: CPTII,S$GLB,, | Performed by: OBSTETRICS & GYNECOLOGY

## 2024-11-18 PROCEDURE — 1160F RVW MEDS BY RX/DR IN RCRD: CPT | Mod: CPTII,S$GLB,, | Performed by: OBSTETRICS & GYNECOLOGY

## 2024-11-18 PROCEDURE — 3079F DIAST BP 80-89 MM HG: CPT | Mod: CPTII,S$GLB,, | Performed by: OBSTETRICS & GYNECOLOGY

## 2024-11-18 RX ORDER — ASCORBIC ACID 250 MG
250 TABLET ORAL DAILY
Status: ON HOLD | COMMUNITY

## 2024-11-18 RX ORDER — FERROUS SULFATE 325(65) MG
325 TABLET ORAL
Status: ON HOLD | COMMUNITY

## 2024-11-18 RX ORDER — LABETALOL 200 MG/1
200 TABLET, FILM COATED ORAL 2 TIMES DAILY
Status: ON HOLD | COMMUNITY
Start: 2024-11-14

## 2024-11-22 ENCOUNTER — ANESTHESIA EVENT (OUTPATIENT)
Dept: OBSTETRICS AND GYNECOLOGY | Facility: HOSPITAL | Age: 29
End: 2024-11-22
Payer: COMMERCIAL

## 2024-11-22 ENCOUNTER — ANESTHESIA (OUTPATIENT)
Dept: OBSTETRICS AND GYNECOLOGY | Facility: HOSPITAL | Age: 29
End: 2024-11-22
Payer: COMMERCIAL

## 2024-11-22 ENCOUNTER — HOSPITAL ENCOUNTER (INPATIENT)
Facility: HOSPITAL | Age: 29
LOS: 3 days | Discharge: HOME | End: 2024-11-25
Attending: OBSTETRICS & GYNECOLOGY | Admitting: OBSTETRICS & GYNECOLOGY
Payer: COMMERCIAL

## 2024-11-22 VITALS — RESPIRATION RATE: 18 BRPM

## 2024-11-22 DIAGNOSIS — Z3A.38 38 WEEKS GESTATION OF PREGNANCY: Primary | ICD-10-CM

## 2024-11-22 DIAGNOSIS — Z34.90 ENCOUNTER FOR INDUCTION OF LABOR: ICD-10-CM

## 2024-11-22 LAB
BASOPHILS # BLD AUTO: 0.04 X10(3)/MCL
BASOPHILS NFR BLD AUTO: 0.5 %
EOSINOPHIL # BLD AUTO: 0.09 X10(3)/MCL (ref 0–0.9)
EOSINOPHIL NFR BLD AUTO: 1.1 %
ERYTHROCYTE [DISTWIDTH] IN BLOOD BY AUTOMATED COUNT: 17.9 % (ref 11.5–17)
GROUP & RH: NORMAL
HCT VFR BLD AUTO: 31.5 % (ref 37–47)
HGB BLD-MCNC: 10.3 G/DL (ref 12–16)
IMM GRANULOCYTES # BLD AUTO: 0.05 X10(3)/MCL (ref 0–0.04)
IMM GRANULOCYTES NFR BLD AUTO: 0.6 %
INDIRECT COOMBS: NORMAL
LYMPHOCYTES # BLD AUTO: 1.44 X10(3)/MCL (ref 0.6–4.6)
LYMPHOCYTES NFR BLD AUTO: 18.4 %
MCH RBC QN AUTO: 28.1 PG (ref 27–31)
MCHC RBC AUTO-ENTMCNC: 32.7 G/DL (ref 33–36)
MCV RBC AUTO: 85.8 FL (ref 80–94)
MONOCYTES # BLD AUTO: 0.67 X10(3)/MCL (ref 0.1–1.3)
MONOCYTES NFR BLD AUTO: 8.5 %
NEUTROPHILS # BLD AUTO: 5.55 X10(3)/MCL (ref 2.1–9.2)
NEUTROPHILS NFR BLD AUTO: 70.9 %
NRBC BLD AUTO-RTO: 0 %
PLATELET # BLD AUTO: 204 X10(3)/MCL (ref 130–400)
PMV BLD AUTO: 10.9 FL (ref 7.4–10.4)
RBC # BLD AUTO: 3.67 X10(6)/MCL (ref 4.2–5.4)
SPECIMEN OUTDATE: NORMAL
T PALLIDUM AB SER QL: NONREACTIVE
WBC # BLD AUTO: 7.84 X10(3)/MCL (ref 4.5–11.5)

## 2024-11-22 PROCEDURE — 25000003 PHARM REV CODE 250: Performed by: OBSTETRICS & GYNECOLOGY

## 2024-11-22 PROCEDURE — 86850 RBC ANTIBODY SCREEN: CPT | Performed by: OBSTETRICS & GYNECOLOGY

## 2024-11-22 PROCEDURE — 63600175 PHARM REV CODE 636 W HCPCS: Performed by: OBSTETRICS & GYNECOLOGY

## 2024-11-22 PROCEDURE — 72200005 HC VAGINAL DELIVERY LEVEL II

## 2024-11-22 PROCEDURE — 11000001 HC ACUTE MED/SURG PRIVATE ROOM

## 2024-11-22 PROCEDURE — 86780 TREPONEMA PALLIDUM: CPT | Performed by: OBSTETRICS & GYNECOLOGY

## 2024-11-22 PROCEDURE — 37000009 HC ANESTHESIA EA ADD 15 MINS: Performed by: ANESTHESIOLOGY

## 2024-11-22 PROCEDURE — 51702 INSERT TEMP BLADDER CATH: CPT

## 2024-11-22 PROCEDURE — 72100003 HC LABOR CARE, EA. ADDL. 8 HRS

## 2024-11-22 PROCEDURE — 85025 COMPLETE CBC W/AUTO DIFF WBC: CPT | Performed by: OBSTETRICS & GYNECOLOGY

## 2024-11-22 PROCEDURE — 62326 NJX INTERLAMINAR LMBR/SAC: CPT | Performed by: ANESTHESIOLOGY

## 2024-11-22 PROCEDURE — 72100002 HC LABOR CARE, 1ST 8 HOURS

## 2024-11-22 PROCEDURE — 37000008 HC ANESTHESIA 1ST 15 MINUTES: Performed by: ANESTHESIOLOGY

## 2024-11-22 PROCEDURE — 25000003 PHARM REV CODE 250: Performed by: ANESTHESIOLOGY

## 2024-11-22 RX ORDER — SODIUM CITRATE AND CITRIC ACID MONOHYDRATE 334; 500 MG/5ML; MG/5ML
30 SOLUTION ORAL ONCE
Status: DISCONTINUED | OUTPATIENT
Start: 2024-11-22 | End: 2024-11-22

## 2024-11-22 RX ORDER — ACETAMINOPHEN 325 MG/1
650 TABLET ORAL EVERY 6 HOURS PRN
Status: DISCONTINUED | OUTPATIENT
Start: 2024-11-22 | End: 2024-11-25 | Stop reason: HOSPADM

## 2024-11-22 RX ORDER — SIMETHICONE 80 MG
1 TABLET,CHEWABLE ORAL EVERY 6 HOURS PRN
Status: DISCONTINUED | OUTPATIENT
Start: 2024-11-22 | End: 2024-11-25 | Stop reason: HOSPADM

## 2024-11-22 RX ORDER — SODIUM CHLORIDE 0.9 % (FLUSH) 0.9 %
10 SYRINGE (ML) INJECTION
Status: DISCONTINUED | OUTPATIENT
Start: 2024-11-22 | End: 2024-11-25 | Stop reason: HOSPADM

## 2024-11-22 RX ORDER — IBUPROFEN 600 MG/1
600 TABLET ORAL
Status: DISCONTINUED | OUTPATIENT
Start: 2024-11-23 | End: 2024-11-22

## 2024-11-22 RX ORDER — METHYLERGONOVINE MALEATE 0.2 MG/ML
200 INJECTION INTRAVENOUS ONCE AS NEEDED
Status: DISCONTINUED | OUTPATIENT
Start: 2024-11-22 | End: 2024-11-25 | Stop reason: HOSPADM

## 2024-11-22 RX ORDER — OXYTOCIN 10 [USP'U]/ML
10 INJECTION, SOLUTION INTRAMUSCULAR; INTRAVENOUS ONCE AS NEEDED
Status: DISCONTINUED | OUTPATIENT
Start: 2024-11-22 | End: 2024-11-22 | Stop reason: SDUPTHER

## 2024-11-22 RX ORDER — LIDOCAINE HYDROCHLORIDE 10 MG/ML
10 INJECTION, SOLUTION INFILTRATION; PERINEURAL ONCE AS NEEDED
Status: DISCONTINUED | OUTPATIENT
Start: 2024-11-22 | End: 2024-11-22

## 2024-11-22 RX ORDER — METHYLERGONOVINE MALEATE 0.2 MG/ML
200 INJECTION INTRAVENOUS ONCE AS NEEDED
Status: DISCONTINUED | OUTPATIENT
Start: 2024-11-22 | End: 2024-11-22 | Stop reason: SDUPTHER

## 2024-11-22 RX ORDER — OXYTOCIN/0.9 % SODIUM CHLORIDE 15/250 ML
95 PLASTIC BAG, INJECTION (ML) INTRAVENOUS ONCE AS NEEDED
Status: DISCONTINUED | OUTPATIENT
Start: 2024-11-22 | End: 2024-11-22 | Stop reason: SDUPTHER

## 2024-11-22 RX ORDER — OXYTOCIN-SODIUM CHLORIDE 0.9% IV SOLN 30 UNIT/500ML 30-0.9/5 UT/ML-%
10 SOLUTION INTRAVENOUS ONCE AS NEEDED
Status: COMPLETED | OUTPATIENT
Start: 2024-11-22 | End: 2024-11-22

## 2024-11-22 RX ORDER — CALCIUM CARBONATE 200(500)MG
500 TABLET,CHEWABLE ORAL 3 TIMES DAILY PRN
Status: DISCONTINUED | OUTPATIENT
Start: 2024-11-22 | End: 2024-11-25 | Stop reason: HOSPADM

## 2024-11-22 RX ORDER — ONDANSETRON 4 MG/1
8 TABLET, ORALLY DISINTEGRATING ORAL EVERY 8 HOURS PRN
Status: DISCONTINUED | OUTPATIENT
Start: 2024-11-22 | End: 2024-11-22 | Stop reason: SDUPTHER

## 2024-11-22 RX ORDER — DIPHENOXYLATE HYDROCHLORIDE AND ATROPINE SULFATE 2.5; .025 MG/1; MG/1
2 TABLET ORAL EVERY 6 HOURS PRN
Status: DISCONTINUED | OUTPATIENT
Start: 2024-11-22 | End: 2024-11-22

## 2024-11-22 RX ORDER — MISOPROSTOL 100 UG/1
800 TABLET ORAL ONCE AS NEEDED
Status: DISCONTINUED | OUTPATIENT
Start: 2024-11-22 | End: 2024-11-25 | Stop reason: HOSPADM

## 2024-11-22 RX ORDER — ACETAMINOPHEN 325 MG/1
650 TABLET ORAL EVERY 6 HOURS SCHEDULED
Status: DISCONTINUED | OUTPATIENT
Start: 2024-11-22 | End: 2024-11-25 | Stop reason: HOSPADM

## 2024-11-22 RX ORDER — CARBOPROST TROMETHAMINE 250 UG/ML
250 INJECTION, SOLUTION INTRAMUSCULAR
Status: DISCONTINUED | OUTPATIENT
Start: 2024-11-22 | End: 2024-11-25 | Stop reason: HOSPADM

## 2024-11-22 RX ORDER — DIPHENHYDRAMINE HYDROCHLORIDE 50 MG/ML
25 INJECTION INTRAMUSCULAR; INTRAVENOUS EVERY 4 HOURS PRN
Status: DISCONTINUED | OUTPATIENT
Start: 2024-11-22 | End: 2024-11-25 | Stop reason: HOSPADM

## 2024-11-22 RX ORDER — DOCUSATE SODIUM 100 MG/1
200 CAPSULE, LIQUID FILLED ORAL 2 TIMES DAILY PRN
Status: DISCONTINUED | OUTPATIENT
Start: 2024-11-22 | End: 2024-11-25 | Stop reason: HOSPADM

## 2024-11-22 RX ORDER — FENTANYL/BUPIVACAINE/NS/PF 2-1250MCG
PLASTIC BAG, INJECTION (ML) INJECTION CONTINUOUS
Status: DISCONTINUED | OUTPATIENT
Start: 2024-11-22 | End: 2024-11-25 | Stop reason: HOSPADM

## 2024-11-22 RX ORDER — OXYTOCIN/0.9 % SODIUM CHLORIDE 15/250 ML
95 PLASTIC BAG, INJECTION (ML) INTRAVENOUS CONTINUOUS PRN
Status: DISCONTINUED | OUTPATIENT
Start: 2024-11-22 | End: 2024-11-25 | Stop reason: HOSPADM

## 2024-11-22 RX ORDER — OXYTOCIN/0.9 % SODIUM CHLORIDE 15/250 ML
95 PLASTIC BAG, INJECTION (ML) INTRAVENOUS CONTINUOUS PRN
Status: DISCONTINUED | OUTPATIENT
Start: 2024-11-22 | End: 2024-11-22 | Stop reason: SDUPTHER

## 2024-11-22 RX ORDER — OXYTOCIN-SODIUM CHLORIDE 0.9% IV SOLN 30 UNIT/500ML 30-0.9/5 UT/ML-%
10 SOLUTION INTRAVENOUS ONCE AS NEEDED
OUTPATIENT
Start: 2024-11-22 | End: 2036-04-20

## 2024-11-22 RX ORDER — IBUPROFEN 600 MG/1
600 TABLET ORAL
Status: DISCONTINUED | OUTPATIENT
Start: 2024-11-22 | End: 2024-11-25 | Stop reason: HOSPADM

## 2024-11-22 RX ORDER — OXYTOCIN/0.9 % SODIUM CHLORIDE 15/250 ML
0-30 PLASTIC BAG, INJECTION (ML) INTRAVENOUS CONTINUOUS
Status: DISCONTINUED | OUTPATIENT
Start: 2024-11-22 | End: 2024-11-22

## 2024-11-22 RX ORDER — DIPHENHYDRAMINE HCL 25 MG
25 CAPSULE ORAL EVERY 4 HOURS PRN
Status: DISCONTINUED | OUTPATIENT
Start: 2024-11-22 | End: 2024-11-25 | Stop reason: HOSPADM

## 2024-11-22 RX ORDER — OXYTOCIN 10 [USP'U]/ML
10 INJECTION, SOLUTION INTRAMUSCULAR; INTRAVENOUS ONCE AS NEEDED
Status: DISCONTINUED | OUTPATIENT
Start: 2024-11-22 | End: 2024-11-25 | Stop reason: HOSPADM

## 2024-11-22 RX ORDER — SODIUM CHLORIDE, SODIUM LACTATE, POTASSIUM CHLORIDE, CALCIUM CHLORIDE 600; 310; 30; 20 MG/100ML; MG/100ML; MG/100ML; MG/100ML
INJECTION, SOLUTION INTRAVENOUS CONTINUOUS
Status: DISCONTINUED | OUTPATIENT
Start: 2024-11-22 | End: 2024-11-22

## 2024-11-22 RX ORDER — DIPHENOXYLATE HYDROCHLORIDE AND ATROPINE SULFATE 2.5; .025 MG/1; MG/1
2 TABLET ORAL EVERY 6 HOURS PRN
Status: DISCONTINUED | OUTPATIENT
Start: 2024-11-22 | End: 2024-11-25 | Stop reason: HOSPADM

## 2024-11-22 RX ORDER — HYDROCORTISONE 25 MG/G
CREAM TOPICAL 3 TIMES DAILY PRN
Status: DISCONTINUED | OUTPATIENT
Start: 2024-11-22 | End: 2024-11-25 | Stop reason: HOSPADM

## 2024-11-22 RX ORDER — SIMETHICONE 80 MG
1 TABLET,CHEWABLE ORAL 4 TIMES DAILY PRN
Status: DISCONTINUED | OUTPATIENT
Start: 2024-11-22 | End: 2024-11-22 | Stop reason: SDUPTHER

## 2024-11-22 RX ORDER — OXYTOCIN-SODIUM CHLORIDE 0.9% IV SOLN 30 UNIT/500ML 30-0.9/5 UT/ML-%
30 SOLUTION INTRAVENOUS ONCE AS NEEDED
Status: DISCONTINUED | OUTPATIENT
Start: 2024-11-22 | End: 2024-11-25 | Stop reason: HOSPADM

## 2024-11-22 RX ORDER — ONDANSETRON 4 MG/1
8 TABLET, ORALLY DISINTEGRATING ORAL EVERY 8 HOURS PRN
Status: DISCONTINUED | OUTPATIENT
Start: 2024-11-22 | End: 2024-11-25 | Stop reason: HOSPADM

## 2024-11-22 RX ORDER — EPHEDRINE SULFATE 50 MG/ML
10 INJECTION, SOLUTION INTRAVENOUS
Status: DISPENSED | OUTPATIENT
Start: 2024-11-22 | End: 2024-11-22

## 2024-11-22 RX ORDER — PRENATAL WITH FERROUS FUM AND FOLIC ACID 3080; 920; 120; 400; 22; 1.84; 3; 20; 10; 1; 12; 200; 27; 25; 2 [IU]/1; [IU]/1; MG/1; [IU]/1; MG/1; MG/1; MG/1; MG/1; MG/1; MG/1; UG/1; MG/1; MG/1; MG/1; MG/1
1 TABLET ORAL DAILY
Status: DISCONTINUED | OUTPATIENT
Start: 2024-11-23 | End: 2024-11-25 | Stop reason: HOSPADM

## 2024-11-22 RX ORDER — HYDROCODONE BITARTRATE AND ACETAMINOPHEN 7.5; 325 MG/1; MG/1
1 TABLET ORAL EVERY 4 HOURS PRN
Status: DISCONTINUED | OUTPATIENT
Start: 2024-11-22 | End: 2024-11-25 | Stop reason: HOSPADM

## 2024-11-22 RX ORDER — OXYTOCIN/0.9 % SODIUM CHLORIDE 15/250 ML
95 PLASTIC BAG, INJECTION (ML) INTRAVENOUS ONCE AS NEEDED
Status: DISCONTINUED | OUTPATIENT
Start: 2024-11-22 | End: 2024-11-25 | Stop reason: HOSPADM

## 2024-11-22 RX ORDER — CARBOPROST TROMETHAMINE 250 UG/ML
250 INJECTION, SOLUTION INTRAMUSCULAR
Status: DISCONTINUED | OUTPATIENT
Start: 2024-11-22 | End: 2024-11-22 | Stop reason: SDUPTHER

## 2024-11-22 RX ORDER — MUPIROCIN 20 MG/G
OINTMENT TOPICAL
OUTPATIENT
Start: 2024-11-22

## 2024-11-22 RX ORDER — HYDROCODONE BITARTRATE AND ACETAMINOPHEN 10; 325 MG/1; MG/1
1 TABLET ORAL EVERY 4 HOURS PRN
Status: DISCONTINUED | OUTPATIENT
Start: 2024-11-22 | End: 2024-11-25 | Stop reason: HOSPADM

## 2024-11-22 RX ORDER — LABETALOL 200 MG/1
200 TABLET, FILM COATED ORAL EVERY 12 HOURS
Status: DISCONTINUED | OUTPATIENT
Start: 2024-11-22 | End: 2024-11-24

## 2024-11-22 RX ADMIN — OXYTOCIN-SODIUM CHLORIDE 0.9% IV SOLN 30 UNIT/500ML 10 UNITS: 30-0.9/5 SOLUTION at 05:11

## 2024-11-22 RX ADMIN — AZITHROMYCIN MONOHYDRATE 500 MG: 500 INJECTION, POWDER, LYOPHILIZED, FOR SOLUTION INTRAVENOUS at 12:11

## 2024-11-22 RX ADMIN — LABETALOL HYDROCHLORIDE 200 MG: 200 TABLET, FILM COATED ORAL at 09:11

## 2024-11-22 RX ADMIN — Medication 12 ML/HR: at 01:11

## 2024-11-22 RX ADMIN — Medication: at 06:11

## 2024-11-22 RX ADMIN — SODIUM CHLORIDE, POTASSIUM CHLORIDE, SODIUM LACTATE AND CALCIUM CHLORIDE: 600; 310; 30; 20 INJECTION, SOLUTION INTRAVENOUS at 08:11

## 2024-11-22 RX ADMIN — IBUPROFEN 600 MG: 600 TABLET, FILM COATED ORAL at 06:11

## 2024-11-22 RX ADMIN — LABETALOL HYDROCHLORIDE 200 MG: 200 TABLET, FILM COATED ORAL at 08:11

## 2024-11-22 RX ADMIN — OXYTOCIN 2 MILLI-UNITS/MIN: 10 INJECTION, SOLUTION INTRAMUSCULAR; INTRAVENOUS at 09:11

## 2024-11-22 RX ADMIN — OXYTOCIN 95 MILLI-UNITS/MIN: 10 INJECTION, SOLUTION INTRAMUSCULAR; INTRAVENOUS at 07:11

## 2024-11-22 RX ADMIN — HYDROCODONE BITARTRATE AND ACETAMINOPHEN 1 TABLET: 7.5; 325 TABLET ORAL at 09:11

## 2024-11-22 NOTE — ANESTHESIA PROCEDURE NOTES
Epidural    Patient location during procedure: OB   Reason for block: primary anesthetic   Reason for block: labor analgesia requested by patient and obstetrician  Diagnosis: Labor pain relief   Start time: 11/22/2024 1:23 PM  Timeout: 11/22/2024 1:23 PM  End time: 11/22/2024 1:42 PM    Staffing  Performing Provider: Tyree Casey MD  Authorizing Provider: Tyree Casey MD    Staffing  Performed by: Tyree Casey MD  Authorized by: Tyree Casey MD        Preanesthetic Checklist  Completed: patient identified, IV checked, site marked, risks and benefits discussed, surgical consent, monitors and equipment checked, pre-op evaluation, timeout performed, anesthesia consent given, hand hygiene performed and patient being monitored  Preparation  Patient position: sitting (Mask worn, sterile gloves worn)  Prep: ChloraPrep and Pt preloaded with 500 to 1000ml of crystalloid  Patient monitoring: Pulse Ox (Pre & Post procedure vitals & FHR are recorded by the nurse Q 5mins as appropriate)  Reason for block: primary anesthetic   Epidural  Skin Anesthetic: lidocaine 1% (25G 1.5inch needle)  Skin Wheal: 5 mL  Administration type: continuous  Approach: midline  Interspace: L3-4    Injection technique: MOOSE saline  Needle and Epidural Catheter  Needle type: Tuohy   Needle gauge: 17  Needle length: 3.5 inches  Needle insertion depth: 7 cm  Catheter type: springwound and multi-orifice  Catheter size: 18 G  Catheter at skin depth: 15 cm  Insertion Attempts: 1  Test dose: 3 mL of lidocaine 1.5% with Epi 1-to-200,000 (Test dose given via epidural catheter)  Additional Documentation: negative aspiration for heme and CSF, no paresthesia on injection, no significant pain on injection, incremental injection, no signs/symptoms of IV or SA injection and no significant complaints from patient (Epidural catheter connected to epidural pump with filter in situ and pt instructed on its use.  Warning label placed on epidural  catheter.)  Needle localization: anatomical landmarks  Assessment  Ease of block: easy  Patient's tolerance of the procedure: comfortable throughout block (Pt returned to supine position with head of bed elevated 30 degrees and EMERITA applied as needed)  Additional Notes    RICARDO pump set to give initial 10ml bolus followed by 12ml/hr with bolus of 2ml, lockout interval 15mins, max 4 boluses/hr, Infusion conc Marcaine 0.125% + Fentanyl 2mcg/ml  Improvement in pain relief documented by L&D nurses  L&D nurses instructed to call if no relief at all after 30 mins No inadvertent dural puncture with Tuohy.  Dural puncture not performed with spinal needle

## 2024-11-22 NOTE — ANESTHESIA PREPROCEDURE EVALUATION
11/22/2024    Radha Tian is a 28 y.o., female.    Pre-op Assessment    I have reviewed the Patient Summary Reports.     I have reviewed the Nursing Notes. I have reviewed the NPO Status.   I have reviewed the Medications.     Review of Systems  Anesthesia Hx:  No problems with previous Anesthesia                Cardiovascular:  Exercise tolerance: good                                             Endocrine:        Morbid Obesity / BMI > 40      Pre-operative evaluation for * No surgery found *    BP (!) 148/87   Pulse 91   Temp 36.8 °C (98.2 °F) (Oral)   Resp 18   SpO2 100%   Breastfeeding No     Past Medical History:   Diagnosis Date    Gestational HTN        Patient Active Problem List   Diagnosis    Chronic hypertension affecting pregnancy    Increased BMI over 40 affecting pregnancy in third trimester    Anemia during pregnancy in third trimester    Thickened placenta    Light for gestational dates affecting management of mother, third trimester, fetus 1 [O36.5931]       Review of patient's allergies indicates:   Allergen Reactions    Pcn [penicillins]        Current Outpatient Medications   Medication Instructions    ascorbic acid (vitamin C) (VITAMIN C) 250 mg, Daily    ferrous sulfate (FEOSOL) 325 mg, With breakfast    labetaloL (NORMODYNE) 200 mg, 2 times daily       Past Surgical History:   Procedure Laterality Date    TONSILLECTOMY         Social History     Socioeconomic History    Marital status: Single   Tobacco Use    Smoking status: Never     Passive exposure: Never    Smokeless tobacco: Never   Substance and Sexual Activity    Alcohol use: Never    Drug use: Never    Sexual activity: Yes     Partners: Male     Social Drivers of Health     Financial Resource Strain: Patient Unable To Answer (10/21/2024)    Overall Financial Resource Strain (CARDIA)     Difficulty of Paying Living Expenses: Patient unable to answer   Food Insecurity: Patient Unable To Answer (10/21/2024)    Hunger Vital Sign     " Worried About Running Out of Food in the Last Year: Patient unable to answer     Ran Out of Food in the Last Year: Patient unable to answer   Transportation Needs: Patient Unable To Answer (10/21/2024)    TRANSPORTATION NEEDS     Transportation : Patient unable to answer   Stress: Patient Unable To Answer (10/21/2024)    [employer] - Occupational Stress Questionnaire     Feeling of Stress : Patient unable to answer   Housing Stability: Patient Unable To Answer (10/21/2024)    Housing Stability Vital Sign     Unable to Pay for Housing in the Last Year: Patient unable to answer     Homeless in the Last Year: Patient unable to answer         Physical Exam  General: Well nourished and Cooperative    Airway:  Mallampati: II   Mouth Opening: Normal  TM Distance: Normal  Tongue: Normal  Neck ROM: Normal ROM    Dental:  Intact    Chest/Lungs:  Clear to auscultation    Heart:  Rhythm: Regular Rhythm        Lab Results   Component Value Date    WBC 7.84 11/22/2024    HGB 10.3 (L) 11/22/2024    HCT 31.5 (L) 11/22/2024    MCV 85.8 11/22/2024     11/22/2024          BMP  Lab Results   Component Value Date    HCT 31.5 (L) 11/22/2024     (L) 11/13/2024    K 3.8 11/13/2024    BUN 4.6 (L) 11/13/2024    CREATININE 0.63 11/13/2024    CALCIUM 9.0 11/13/2024        INR  No results for input(s): "PT", "INR", "PROTIME", "APTT" in the last 72 hours.      Diagnostic Studies:  .  EKG  No results found for this or any previous visit.      Anesthesia Plan  Type of Anesthesia, risks & benefits discussed:    Anesthesia Type: Regional  Intra-op Monitoring Plan: Standard ASA Monitors  Post Op Pain Control Plan: multimodal analgesia  Informed Consent: Informed consent signed with the Patient and all parties understand the risks and agree with anesthesia plan.  All questions answered.   ASA Score: 2  Day of Surgery Review of History & Physical: H&P Update referred to the surgeon/provider.    Ready For " Surgery From Anesthesia Perspective.     .  Anesthesia consent includes material facts, risks, complications & alternatives, and possibility of altering the anesthesia plan due to intraoperative conditions.    I reviewed problem list, prior to admission medication list, appropriate labs, any workup, Xray, EKG etc   See anesthesia chart for details of the anesthesia plan carried out.       Tyree Casey MD

## 2024-11-23 LAB
ALBUMIN SERPL-MCNC: 2.6 G/DL (ref 3.5–5)
ALBUMIN/GLOB SERPL: 0.7 RATIO (ref 1.1–2)
ALP SERPL-CCNC: 123 UNIT/L (ref 40–150)
ALT SERPL-CCNC: 11 UNIT/L (ref 0–55)
ANION GAP SERPL CALC-SCNC: 7 MEQ/L
AST SERPL-CCNC: 25 UNIT/L (ref 5–34)
BACTERIA #/AREA URNS AUTO: ABNORMAL /HPF
BASOPHILS # BLD AUTO: 0.03 X10(3)/MCL
BASOPHILS # BLD AUTO: 0.04 X10(3)/MCL
BASOPHILS NFR BLD AUTO: 0.3 %
BASOPHILS NFR BLD AUTO: 0.3 %
BILIRUB SERPL-MCNC: 0.3 MG/DL
BILIRUB UR QL STRIP.AUTO: NEGATIVE
BUN SERPL-MCNC: 4.6 MG/DL (ref 7–18.7)
C TRACH DNA SPEC QL NAA+PROBE: NOT DETECTED
CALCIUM SERPL-MCNC: 9.4 MG/DL (ref 8.4–10.2)
CHLORIDE SERPL-SCNC: 106 MMOL/L (ref 98–107)
CLARITY UR: ABNORMAL
CO2 SERPL-SCNC: 22 MMOL/L (ref 22–29)
COLOR UR AUTO: ABNORMAL
CREAT SERPL-MCNC: 0.71 MG/DL (ref 0.55–1.02)
CREAT/UREA NIT SERPL: 6
EOSINOPHIL # BLD AUTO: 0.07 X10(3)/MCL (ref 0–0.9)
EOSINOPHIL # BLD AUTO: 0.08 X10(3)/MCL (ref 0–0.9)
EOSINOPHIL NFR BLD AUTO: 0.5 %
EOSINOPHIL NFR BLD AUTO: 0.7 %
ERYTHROCYTE [DISTWIDTH] IN BLOOD BY AUTOMATED COUNT: 17.9 % (ref 11.5–17)
ERYTHROCYTE [DISTWIDTH] IN BLOOD BY AUTOMATED COUNT: 18 % (ref 11.5–17)
GFR SERPLBLD CREATININE-BSD FMLA CKD-EPI: >60 ML/MIN/1.73/M2
GLOBULIN SER-MCNC: 3.6 GM/DL (ref 2.4–3.5)
GLUCOSE SERPL-MCNC: 94 MG/DL (ref 74–100)
GLUCOSE UR QL STRIP: NORMAL
HCT VFR BLD AUTO: 28.2 % (ref 37–47)
HCT VFR BLD AUTO: 29.4 % (ref 37–47)
HGB BLD-MCNC: 9.4 G/DL (ref 12–16)
HGB BLD-MCNC: 9.5 G/DL (ref 12–16)
HGB UR QL STRIP: ABNORMAL
IMM GRANULOCYTES # BLD AUTO: 0.05 X10(3)/MCL (ref 0–0.04)
IMM GRANULOCYTES # BLD AUTO: 0.07 X10(3)/MCL (ref 0–0.04)
IMM GRANULOCYTES NFR BLD AUTO: 0.4 %
IMM GRANULOCYTES NFR BLD AUTO: 0.5 %
KETONES UR QL STRIP: ABNORMAL
LDH SERPL-CCNC: 254 U/L (ref 125–220)
LEUKOCYTE ESTERASE UR QL STRIP: 500
LYMPHOCYTES # BLD AUTO: 1.52 X10(3)/MCL (ref 0.6–4.6)
LYMPHOCYTES # BLD AUTO: 1.69 X10(3)/MCL (ref 0.6–4.6)
LYMPHOCYTES NFR BLD AUTO: 11.3 %
LYMPHOCYTES NFR BLD AUTO: 14.4 %
MCH RBC QN AUTO: 27.6 PG (ref 27–31)
MCH RBC QN AUTO: 28.5 PG (ref 27–31)
MCHC RBC AUTO-ENTMCNC: 32.3 G/DL (ref 33–36)
MCHC RBC AUTO-ENTMCNC: 33.3 G/DL (ref 33–36)
MCV RBC AUTO: 85.5 FL (ref 80–94)
MCV RBC AUTO: 85.5 FL (ref 80–94)
MONOCYTES # BLD AUTO: 1 X10(3)/MCL (ref 0.1–1.3)
MONOCYTES # BLD AUTO: 1.23 X10(3)/MCL (ref 0.1–1.3)
MONOCYTES NFR BLD AUTO: 8.5 %
MONOCYTES NFR BLD AUTO: 9.1 %
N GONORRHOEA DNA SPEC QL NAA+PROBE: NOT DETECTED
NEUTROPHILS # BLD AUTO: 10.52 X10(3)/MCL (ref 2.1–9.2)
NEUTROPHILS # BLD AUTO: 8.9 X10(3)/MCL (ref 2.1–9.2)
NEUTROPHILS NFR BLD AUTO: 75.7 %
NEUTROPHILS NFR BLD AUTO: 78.3 %
NITRITE UR QL STRIP: NEGATIVE
NRBC BLD AUTO-RTO: 0 %
NRBC BLD AUTO-RTO: 0 %
PH UR STRIP: 6.5 [PH]
PLATELET # BLD AUTO: 205 X10(3)/MCL (ref 130–400)
PLATELET # BLD AUTO: 209 X10(3)/MCL (ref 130–400)
PMV BLD AUTO: 10.7 FL (ref 7.4–10.4)
PMV BLD AUTO: 10.8 FL (ref 7.4–10.4)
POTASSIUM SERPL-SCNC: 4 MMOL/L (ref 3.5–5.1)
PROT SERPL-MCNC: 6.2 GM/DL (ref 6.4–8.3)
PROT UR QL STRIP: ABNORMAL
RBC # BLD AUTO: 3.3 X10(6)/MCL (ref 4.2–5.4)
RBC # BLD AUTO: 3.44 X10(6)/MCL (ref 4.2–5.4)
RBC #/AREA URNS AUTO: >100 /HPF
SODIUM SERPL-SCNC: 135 MMOL/L (ref 136–145)
SOURCE (OHS): NORMAL
SP GR UR STRIP.AUTO: 1.01 (ref 1–1.03)
SQUAMOUS #/AREA URNS LPF: ABNORMAL /HPF
URATE SERPL-MCNC: 5.6 MG/DL (ref 2.6–6)
UROBILINOGEN UR STRIP-ACNC: NORMAL
WBC # BLD AUTO: 11.75 X10(3)/MCL (ref 4.5–11.5)
WBC # BLD AUTO: 13.45 X10(3)/MCL (ref 4.5–11.5)
WBC #/AREA URNS AUTO: ABNORMAL /HPF

## 2024-11-23 PROCEDURE — 90471 IMMUNIZATION ADMIN: CPT | Performed by: OBSTETRICS & GYNECOLOGY

## 2024-11-23 PROCEDURE — 3E0234Z INTRODUCTION OF SERUM, TOXOID AND VACCINE INTO MUSCLE, PERCUTANEOUS APPROACH: ICD-10-PCS | Performed by: OBSTETRICS & GYNECOLOGY

## 2024-11-23 PROCEDURE — 85025 COMPLETE CBC W/AUTO DIFF WBC: CPT | Performed by: OBSTETRICS & GYNECOLOGY

## 2024-11-23 PROCEDURE — 87491 CHLMYD TRACH DNA AMP PROBE: CPT | Performed by: OBSTETRICS & GYNECOLOGY

## 2024-11-23 PROCEDURE — 80053 COMPREHEN METABOLIC PANEL: CPT | Performed by: OBSTETRICS & GYNECOLOGY

## 2024-11-23 PROCEDURE — 36415 COLL VENOUS BLD VENIPUNCTURE: CPT | Performed by: OBSTETRICS & GYNECOLOGY

## 2024-11-23 PROCEDURE — 90715 TDAP VACCINE 7 YRS/> IM: CPT | Performed by: OBSTETRICS & GYNECOLOGY

## 2024-11-23 PROCEDURE — 83615 LACTATE (LD) (LDH) ENZYME: CPT | Performed by: OBSTETRICS & GYNECOLOGY

## 2024-11-23 PROCEDURE — 25000003 PHARM REV CODE 250: Performed by: OBSTETRICS & GYNECOLOGY

## 2024-11-23 PROCEDURE — 63600175 PHARM REV CODE 636 W HCPCS: Performed by: OBSTETRICS & GYNECOLOGY

## 2024-11-23 PROCEDURE — 11000001 HC ACUTE MED/SURG PRIVATE ROOM

## 2024-11-23 PROCEDURE — 84550 ASSAY OF BLOOD/URIC ACID: CPT | Performed by: OBSTETRICS & GYNECOLOGY

## 2024-11-23 PROCEDURE — 81001 URINALYSIS AUTO W/SCOPE: CPT | Performed by: OBSTETRICS & GYNECOLOGY

## 2024-11-23 RX ADMIN — IBUPROFEN 600 MG: 600 TABLET, FILM COATED ORAL at 02:11

## 2024-11-23 RX ADMIN — IBUPROFEN 600 MG: 600 TABLET, FILM COATED ORAL at 08:11

## 2024-11-23 RX ADMIN — LABETALOL HYDROCHLORIDE 200 MG: 200 TABLET, FILM COATED ORAL at 08:11

## 2024-11-23 RX ADMIN — HYDROCODONE BITARTRATE AND ACETAMINOPHEN 1 TABLET: 7.5; 325 TABLET ORAL at 02:11

## 2024-11-23 RX ADMIN — PRENATAL VITAMINS-IRON FUMARATE 27 MG IRON-FOLIC ACID 0.8 MG TABLET 1 TABLET: at 08:11

## 2024-11-23 RX ADMIN — HYDROCODONE BITARTRATE AND ACETAMINOPHEN 1 TABLET: 7.5; 325 TABLET ORAL at 11:11

## 2024-11-23 RX ADMIN — TETANUS TOXOID, REDUCED DIPHTHERIA TOXOID AND ACELLULAR PERTUSSIS VACCINE, ADSORBED 0.5 ML: 5; 2.5; 8; 8; 2.5 SUSPENSION INTRAMUSCULAR at 08:11

## 2024-11-23 RX ADMIN — IBUPROFEN 600 MG: 600 TABLET, FILM COATED ORAL at 12:11

## 2024-11-23 NOTE — ANESTHESIA POSTPROCEDURE EVALUATION
Anesthesia Post Evaluation    Patient: Radha Tian    Procedure(s) Performed: * No procedures listed *    Final Anesthesia Type: epidural      Patient location during evaluation: PACU  Patient participation: Yes- Able to Participate  Level of consciousness: awake and alert and oriented  Post-procedure vital signs: reviewed and stable  Pain management: adequate  Airway patency: patent  MILDRED mitigation strategies: Use of major conduction anesthesia (spinal/epidural) or peripheral nerve block  PONV status at discharge: No PONV  Anesthetic complications: no      Cardiovascular status: blood pressure returned to baseline and stable  Respiratory status: unassisted  Hydration status: euvolemic  Follow-up not needed.  Comments: EPIDURAL BLK RESOLVED , SENSORY MOTOR INTACT, DENIES HEADACHE.                Vitals Value Taken Time   /76 11/23/24 1556   Temp 36.4 °C (97.5 °F) 11/23/24 1556   Pulse 80 11/23/24 1556   Resp 18 11/23/24 1151   SpO2 98 % 11/23/24 1556         No case tracking events are documented in the log.      Pain/Lorena Score: Pain Rating Prior to Med Admin: 5 (11/23/2024  2:35 PM)  Pain Rating Post Med Admin: 0 (11/23/2024  3:35 PM)

## 2024-11-23 NOTE — PLAN OF CARE
Problem: Adult Inpatient Plan of Care  Goal: Plan of Care Review  Outcome: Progressing  Goal: Patient-Specific Goal (Individualized)  Outcome: Progressing  Goal: Absence of Hospital-Acquired Illness or Injury  Outcome: Progressing  Goal: Optimal Comfort and Wellbeing  Outcome: Progressing  Goal: Readiness for Transition of Care  Outcome: Progressing     Problem: Bariatric Environmental Safety  Goal: Safety Maintained with Care  Outcome: Progressing     Problem: Infection  Goal: Absence of Infection Signs and Symptoms  Outcome: Progressing     Problem:  Fall Injury Risk  Goal: Absence of Fall, Infant Drop and Related Injury  Outcome: Progressing     Problem: Labor  Goal: Hemostasis  Outcome: Progressing  Goal: Stable Fetal Wellbeing  Outcome: Progressing  Goal: Effective Progression to Delivery  Outcome: Progressing  Goal: Absence of Infection Signs and Symptoms  Outcome: Progressing  Goal: Acceptable Pain Control  Outcome: Progressing  Goal: Normal Uterine Contraction Pattern  Outcome: Progressing     Problem: Postpartum (Vaginal Delivery)  Goal: Successful Parent Role Transition  Outcome: Progressing  Goal: Hemostasis  Outcome: Progressing  Goal: Absence of Infection Signs and Symptoms  Outcome: Progressing  Goal: Anesthesia/Sedation Recovery  Outcome: Progressing  Goal: Optimal Pain Control and Function  Outcome: Progressing  Goal: Effective Urinary Elimination  Outcome: Progressing

## 2024-11-23 NOTE — OP NOTE
[facility]                       [address] [id number]    PATIENT NAME:      JEAN PFEIFFER  YOB: 1995  CSN:               [id number]  MRN:               [id number]  ADMIT DATE:        2024 07:29:00  PHYSICIAN:         Enrique Denney MD                          OPERATIVE REPORT      DATE OF SURGERY:    2024 08:30:03    SURGEON:  Enrique Denney MD    DELIVERY NOTE:  Ms. Pfeiffer is a 28-year-old female, who is  4, para 1, who   presented spontaneously in labor at 38 weeks of gestation.  She was given IV   Zithromax for infection.  She had a vaginal delivery without event.  It was   precipitous.  No episiotomy was necessary.  There was delivery of the head.  The   patient was able to bear down.  The cord was clamped x2 and excised.  Cord   blood was obtained.  The placenta was removed and sent to Pathology for   histopathologic diagnoses.  Blood loss was 300 cc.  The patient was discharged   to postpartum for normal postpartum care.  Her blood type is O-positive.        ______________________________  Enrique Denney MD    DCR/AQS  DD:  2024  Time:  06:05PM  DT:  2024  Time:  09:07PM  Job #:  [id number]    cc:   __________, Fax [phone]        __________, Fax [phone]        OPERATIVE REPORT

## 2024-11-24 PROCEDURE — 25000003 PHARM REV CODE 250: Performed by: OBSTETRICS & GYNECOLOGY

## 2024-11-24 PROCEDURE — 11000001 HC ACUTE MED/SURG PRIVATE ROOM

## 2024-11-24 RX ORDER — LABETALOL 200 MG/1
200 TABLET, FILM COATED ORAL 3 TIMES DAILY
Status: DISCONTINUED | OUTPATIENT
Start: 2024-11-24 | End: 2024-11-25 | Stop reason: HOSPADM

## 2024-11-24 RX ADMIN — IBUPROFEN 600 MG: 600 TABLET, FILM COATED ORAL at 03:11

## 2024-11-24 RX ADMIN — LABETALOL HYDROCHLORIDE 200 MG: 200 TABLET, FILM COATED ORAL at 07:11

## 2024-11-24 RX ADMIN — HYDROCODONE BITARTRATE AND ACETAMINOPHEN 1 TABLET: 7.5; 325 TABLET ORAL at 01:11

## 2024-11-24 RX ADMIN — PRENATAL VITAMINS-IRON FUMARATE 27 MG IRON-FOLIC ACID 0.8 MG TABLET 1 TABLET: at 07:11

## 2024-11-24 RX ADMIN — LABETALOL HYDROCHLORIDE 200 MG: 200 TABLET, FILM COATED ORAL at 08:11

## 2024-11-24 RX ADMIN — HYDROCODONE BITARTRATE AND ACETAMINOPHEN 1 TABLET: 7.5; 325 TABLET ORAL at 07:11

## 2024-11-24 RX ADMIN — LABETALOL HYDROCHLORIDE 200 MG: 200 TABLET, FILM COATED ORAL at 03:11

## 2024-11-24 RX ADMIN — DOCUSATE SODIUM 200 MG: 100 CAPSULE, LIQUID FILLED ORAL at 07:11

## 2024-11-24 RX ADMIN — IBUPROFEN 600 MG: 600 TABLET, FILM COATED ORAL at 09:11

## 2024-11-24 RX ADMIN — IBUPROFEN 600 MG: 600 TABLET, FILM COATED ORAL at 07:11

## 2024-11-24 NOTE — PLAN OF CARE
Problem: Adult Inpatient Plan of Care  Goal: Plan of Care Review  2024 2330 by Cindy Hernandez RN  Outcome: Progressing  2024 2329 by Cindy Hernandez RN  Outcome: Progressing  Goal: Patient-Specific Goal (Individualized)  2024 233 by Cindy Hernandez RN  Outcome: Progressing  2024 2329 by Cindy Hernandez RN  Outcome: Progressing  Goal: Absence of Hospital-Acquired Illness or Injury  2024 2330 by Cindy Hernandez RN  Outcome: Progressing  2024 2329 by Cindy Hernandez RN  Outcome: Progressing  Goal: Optimal Comfort and Wellbeing  2024 233 by Cindy Hernandez RN  Outcome: Progressing  2024 232 by Cindy Hernandez RN  Outcome: Progressing  Goal: Readiness for Transition of Care  2024 233 by Cindy Hernandez RN  Outcome: Progressing  2024 232 by Cindy Hernandez RN  Outcome: Progressing     Problem: Bariatric Environmental Safety  Goal: Safety Maintained with Care  2024 233 by Cindy Hernandez RN  Outcome: Progressing  2024 232 by Cindy Hernandez RN  Outcome: Progressing     Problem: Infection  Goal: Absence of Infection Signs and Symptoms  2024 233 by Cindy Hernandez RN  Outcome: Progressing  2024 232 by Cindy Hernandez RN  Outcome: Progressing     Problem:  Fall Injury Risk  Goal: Absence of Fall, Infant Drop and Related Injury  2024 233 by Cindy Hernandez RN  Outcome: Progressing  2024 232 by Cindy Hernandez RN  Outcome: Progressing     Problem: Labor  Goal: Hemostasis  2024 233 by Cindy Hernandez RN  Outcome: Progressing  2024 232 by Cindy Hernandez RN  Outcome: Progressing  Goal: Stable Fetal Wellbeing  2024 233 by Cindy Hernandez RN  Outcome: Progressing  2024 232 by Cindy Hernandez RN  Outcome: Progressing  Goal: Effective Progression to Delivery  2024 233 by Cindy Hernandez RN  Outcome: Progressing  2024 2329 by Cindy Hernandez, DEBBIE  Outcome:  Progressing  Goal: Absence of Infection Signs and Symptoms  11/23/2024 2330 by Cindy Hernandez RN  Outcome: Progressing  11/23/2024 2329 by Cindy Hernandez RN  Outcome: Progressing  Goal: Acceptable Pain Control  11/23/2024 2330 by Cindy Hernandez RN  Outcome: Progressing  11/23/2024 2329 by Cindy Hernandez RN  Outcome: Progressing  Goal: Normal Uterine Contraction Pattern  11/23/2024 2330 by Cindy Hernandez RN  Outcome: Progressing  11/23/2024 2329 by Cindy Hernandez RN  Outcome: Progressing     Problem: Postpartum (Vaginal Delivery)  Goal: Successful Parent Role Transition  11/23/2024 2330 by Cindy Hernandez RN  Outcome: Progressing  11/23/2024 2329 by Cindy Hernandez RN  Outcome: Progressing  Goal: Hemostasis  11/23/2024 2330 by Cindy Hernandez RN  Outcome: Progressing  11/23/2024 2329 by Cindy Hernandez RN  Outcome: Progressing  Goal: Absence of Infection Signs and Symptoms  11/23/2024 2330 by Cindy Hernandez RN  Outcome: Progressing  11/23/2024 2329 by Cindy Hernandez RN  Outcome: Progressing  Goal: Anesthesia/Sedation Recovery  11/23/2024 2330 by Cindy Hernandez RN  Outcome: Progressing  11/23/2024 2329 by Cindy Hernandez RN  Outcome: Progressing  Goal: Optimal Pain Control and Function  11/23/2024 2330 by Cindy Hernandez RN  Outcome: Progressing  11/23/2024 2329 by Cindy Hernandez RN  Outcome: Progressing  Goal: Effective Urinary Elimination  11/23/2024 2330 by Cindy Hernandez RN  Outcome: Progressing  11/23/2024 2329 by Cindy Hernandez RN  Outcome: Progressing

## 2024-11-24 NOTE — NURSING
"Pt. Educated on proper ways to store and prepare donated breast milk. Pt. Encouraged to ask questions. Education material at bedside. Pt. States "I've been reheating the same 4 oz bottle of milk when she eats." Pt given written information about safe times for milk to be outside of the fridge. Pt. Verbalizes understanding that she should not reheat milk as it is not safe for the baby to drink it. Pt distracted during teaching with a facetime call but verbalized understanding. Teach back method utilized to ensure pt understands. Care continuing.   "

## 2024-11-24 NOTE — PROGRESS NOTES
[facility]                        [address] [address]    PATIENT NAME:       JEAN PFEIFFER  YOB: 1995  CSN:                [id number]   MRN:                [id number]  ADMIT DATE:         2024 07:29:00  PHYSICIAN:          Enrique Denney MD                            PROGRESS NOTE    DATE:      Ms. Pfeiffer is in postpartum day #3, status post .  She has a history of   hypertension.  She is presently on labetalol 200 mg twice a day.  We will change   her to labetalol 200 mg 3 times a day.  Her blood pressure is 145/98.  She   denies any major clinical symptomatology.  There is some lower extremity   hyperreflexia.  She is complaining of lower epidural back pain.  We will get an   Anesthesia consult for evaluation of that.  She had a pregnancy-induced   hypertension panel done.  Her labs are as follows; uric acid is 5.6, the lactate   dehydrogenase is 254, which is elevated.  CMP, her liver enzymes are 11 and 25,   which is normal.  CBC, her hemoglobin and hematocrit is 9.4 and 28.2, white   cell count is 11.7, platelet count is 209.  Urine output is within normal   limits.  Clinically, she looks okay, we will increase her labetalol with hopes   that she will do well overnight and discharged to home tomorrow on 200 mg 3   times a day.        ______________________________  Enrique Denney MD    DCR/AQS  DD:  2024  Time:  12:04PM  DT:  2024  Time:  01:54PM  Job #:  [id number]      PROGRESS NOTE

## 2024-11-25 ENCOUNTER — ANESTHESIA EVENT (OUTPATIENT)
Dept: OBSTETRICS AND GYNECOLOGY | Facility: HOSPITAL | Age: 29
End: 2024-11-25
Payer: COMMERCIAL

## 2024-11-25 ENCOUNTER — PATIENT OUTREACH (OUTPATIENT)
Dept: ADMINISTRATIVE | Facility: CLINIC | Age: 29
End: 2024-11-25
Payer: COMMERCIAL

## 2024-11-25 ENCOUNTER — ANESTHESIA (OUTPATIENT)
Dept: OBSTETRICS AND GYNECOLOGY | Facility: HOSPITAL | Age: 29
End: 2024-11-25
Payer: COMMERCIAL

## 2024-11-25 VITALS
OXYGEN SATURATION: 98 % | HEART RATE: 76 BPM | RESPIRATION RATE: 18 BRPM | SYSTOLIC BLOOD PRESSURE: 146 MMHG | DIASTOLIC BLOOD PRESSURE: 85 MMHG | TEMPERATURE: 98 F

## 2024-11-25 PROCEDURE — 25000003 PHARM REV CODE 250: Performed by: OBSTETRICS & GYNECOLOGY

## 2024-11-25 RX ADMIN — IBUPROFEN 600 MG: 600 TABLET, FILM COATED ORAL at 08:11

## 2024-11-25 RX ADMIN — HYDROCODONE BITARTRATE AND ACETAMINOPHEN 1 TABLET: 10; 325 TABLET ORAL at 08:11

## 2024-11-25 RX ADMIN — LABETALOL HYDROCHLORIDE 200 MG: 200 TABLET, FILM COATED ORAL at 08:11

## 2024-11-25 RX ADMIN — LABETALOL HYDROCHLORIDE 200 MG: 200 TABLET, FILM COATED ORAL at 04:11

## 2024-11-25 NOTE — LACTATION NOTE
This note was copied from a baby's chart.  Mom is doing both breast and formula feeding. Mom says her milk is increased and breasts are very full. Tips on prevention and management of engorgement reviewed. Mom says baby is latching and she is doing some pumping for comfort too. Mom reports baby having more trouble to latch to right side. Gave tips on helping baby latch to this side and encouraged mom to pump this side if baby is not latching to it.     Discharge instructions reviewed. Answered moms questions. Verbalized understanding of all.       [facility]   [phone]   Discharge Instructions      Feed baby when you notice early hunger cues, such as rooting, hand to mouth, smacking lips, sticking out tongue.  Crying is a late sign of hunger. Try to get baby to the breast before crying begins. (page 2 & 39 of booklet)      Most newborns need to eat at least 8 times in a 24-hour period. Feeding often will help develop milk supply.  Feed baby anytime hunger cues are noticed, and wake baby if needed to ensure 8 or more feeds per 24 hour period. (pg. 24)      Cluster feeding is normal: baby may nurse very often for several times in a row.  This commonly occurs in the evening or early part of the night. (pg. 4)       Allow your baby to finish one side before offering the other. You can try to burp baby and then offer the other breast if he/she seems to still be hungry.       Skin to skin contact can help a sleepy baby want to nurse. Babies held skin to skin, often nurse better and longer. Skin to skin increases moms milk-making hormone levels as well. Skin to skin is calming for mom and baby. (pg. 23)      In the early days, the number of wet and dirty diapers baby has each day can help you know if baby is getting enough to eat.  Refer to your breastfeeding booklet (pgs. 2-12) to see how many wet/dirty diapers baby should be having each day.  Contact babys pediatrician or lactation, if baby is not  having enough wet and dirty diapers.      It is best to avoid pacifiers and bottles for the first 4 weeks, while getting breastfeeding established.        Back to work or school:  4 weeks is a good time to start pumping after morning feeds, in order to store milk for baby. Although you may pump before if needed. Week 4 is also a good time to introduce a bottle of pumped milk to baby, if you will be going back to work or school.  This can be done sooner if needed. (Refer to pgs 25-27 for pumping and milk storage)       When baby is latched deeply to your breast, you should feel a strong tugging or pulling sensation. If your nipples are sore, you may feel mild discomfort with initial latch that eases quickly.  If there is pain, try to adjust the latch. Make sure your baby opens his mouth wide to latch on. Scan the QR code on page 18 for a video on latching.       Listen for swallowing when baby is nursing. This indicates your baby is transferring that milk!      Your milk will increase between days 3-5.  Frequent feeds can help prevent engorgement.      If your breasts begin to get engorged, refer to pages 20 & 21 for tips on management.  Feed often to help keep your breasts comfortable. If baby is not able to remove milk from your breasts, pumping will be needed to relieve breast fullness and build milk supply. If baby is removing milk well from your breasts, but they are still uncomfortably full after, You may need to pump for comfort, meaning just pump enough to relieve the fullness.      No soap or lotions to the nipples except for medical grade lanolin or nipple cream for soreness.        All babies go through growth spurts. The first one is generally around 2-3 weeks. If your baby starts to nurse a lot more than usual, this is likely the reason.  Growth spurts happen every so often, and usually lasts for 3-5 days.      Remember to check the safety of any medications, prescription or non-prescription, and  herbals, before you take them.  Your babys pediatrician is the best one to confirm the safety of the medication while you are breastfeeding. You may also the lactation department, or the [facility] at [phone], to check the safety of a medication. (pg 31)      Call with any questions or concerns. Dont wait --ask for help early. Breastfeeding Resources can be found on pages 33-35 of your Breastfeeding Booklet given to you in the hospital.

## 2024-11-25 NOTE — CONSULTS
Request for evaluation of this pt.for back pain. This pt. Is s/p  Natural spontaneous vaginal Delivery with Epidural placement for labor pain. She delivered on 11/22/24 @08:30. No apparent difficulties with placement were noted.    The pt's chart was reviewed, she was interviewed and examined with postpartum nurse present on 11/24/24, She describes primarily Right lower back pain and slight weakness when she walks in her right leg. No paresthesia, numbness or tingling in either lower extremity. The site was clean, no drainage or induration noted.( No redness, or swelling). Slight tenderness over the epidural site as expected.     The pt.has been using warm compresses over the site and taking in  alternating doses Motrin and Percocet. I explained that postpartum back pain is not uncommon post delivery due to the pushing, straining required with delivery. Also, with epidural it is possible to have back pain, particularly at the site of insertion. I also explained that this is usually is self limiting and resolves over the course of a day or 2, up to a week. If the pain does not resolve or improve, or evidence of infection, ie.drainage, redness, swelling, worsening of tenderness,weakness, fever, that she should return to see Dr. Denney or go to the Emergency room.    She should continue her pain meds as ordered by  and the warm compresses. She should also get out of bed, walk and stretch as much as possible, so as not to get stiff and perhaps allow those muscles to spasm. I reassured her and she appeared to understand and accept. Appreciate the consult.    MD Thalia

## 2024-11-25 NOTE — PROGRESS NOTES
[facility]                       [address] [address]    PATIENT NAME:       JEAN PFEIFFER  YOB: 1995  CSN:                [id number]   MRN:                [id number]  ADMIT DATE:         2024 07:29:00  PHYSICIAN:          Enrique Denney MD                            PROGRESS NOTE    DATE:      SUBJECTIVE:  Ms. Pfeiffer is in postpartum day #1, status post .  She is doing   well.  She is normotensive.  She is afebrile.  She received IV Zithromax during   her delivery recliner.  We are going to repeat the urine GC and chlamydia today.    PLAN:  To discharge to home within 24-48 hours.  Her blood type is O positive.        ______________________________  Enrique Denney MD    DCR/AQS  DD:  2024  Time:  01:21PM  DT:  2024  Time:  01:26PM  Job #:  [id number]      PROGRESS NOTE

## 2024-11-26 LAB — PSYCHE PATHOLOGY RESULT: NORMAL

## 2024-12-07 NOTE — DISCHARGE SUMMARY
[facility]                       [address] [address]    PATIENT NAME:       JENA PFEIFFER  YOB: 1995  CSN:                [id number]   MRN:                [id number]  ADMIT DATE:         2024 07:29:00  PHYSICIAN:          Enrique Denney MD                          DISCHARGE SUMMARY    DATE OF DISCHARGE:  2024 19:26:00    HOSPITAL COURSE:  Ms. Pfeiffer is a 28-year-old female, who is  4, para 1,   who presented in labor.  She had a vaginal infection.  She was given IV   Zithromax.  She had a vaginal delivery without any major event.  She did well   intrapartum as well as postpartum.  She had some elevated blood pressures.  She   was evaluated and given antihypertensives as necessary.  She was discharged home   with a followup in office in 1 week.        ______________________________  Enrique Denney MD    DCR/AQS  DD:  2024  Time:  05:04PM  DT:  2024  Time:  08:32PM  Job #:  [id number]    cc:    _________        DISCHARGE SUMMARY

## 2025-07-03 ENCOUNTER — TELEPHONE (OUTPATIENT)
Dept: GYNECOLOGY | Facility: CLINIC | Age: 30
End: 2025-07-03
Payer: COMMERCIAL

## 2025-07-03 DIAGNOSIS — R87.610 ASCUS WITH POSITIVE HIGH RISK HPV CERVICAL: Primary | ICD-10-CM

## 2025-07-03 DIAGNOSIS — R87.810 ASCUS WITH POSITIVE HIGH RISK HPV CERVICAL: Primary | ICD-10-CM

## 2025-07-03 NOTE — TELEPHONE ENCOUNTER
Pt voiced that she figueroa not have [employer] anymore. I notified Tranise of this.

## 2025-07-20 ENCOUNTER — HOSPITAL ENCOUNTER (EMERGENCY)
Facility: HOSPITAL | Age: 30
Discharge: HOME | End: 2025-07-20
Attending: EMERGENCY MEDICINE
Payer: MEDICAID

## 2025-07-20 VITALS
WEIGHT: 215 LBS | HEART RATE: 97 BPM | DIASTOLIC BLOOD PRESSURE: 85 MMHG | HEIGHT: 63 IN | RESPIRATION RATE: 20 BRPM | SYSTOLIC BLOOD PRESSURE: 118 MMHG | OXYGEN SATURATION: 97 % | TEMPERATURE: 99 F | BODY MASS INDEX: 38.09 KG/M2

## 2025-07-20 DIAGNOSIS — K64.4 EXTERNAL HEMORRHOID: Primary | ICD-10-CM

## 2025-07-20 DIAGNOSIS — K62.5 RECTAL BLEEDING: ICD-10-CM

## 2025-07-20 LAB
ANION GAP SERPL CALC-SCNC: 10 MEQ/L
B-HCG UR QL: NEGATIVE
BACTERIA #/AREA URNS AUTO: ABNORMAL /HPF
BASOPHILS # BLD AUTO: 0.04 X10(3)/MCL
BASOPHILS NFR BLD AUTO: 0.4 %
BILIRUB UR QL STRIP.AUTO: ABNORMAL
BUN SERPL-MCNC: 9 MG/DL (ref 7–18.7)
CALCIUM SERPL-MCNC: 9.5 MG/DL (ref 8.4–10.2)
CHLORIDE SERPL-SCNC: 107 MMOL/L (ref 98–107)
CLARITY UR: ABNORMAL
CO2 SERPL-SCNC: 22 MMOL/L (ref 22–29)
COLOR UR AUTO: YELLOW
CREAT SERPL-MCNC: 0.84 MG/DL (ref 0.55–1.02)
CREAT/UREA NIT SERPL: 11
EOSINOPHIL # BLD AUTO: 0.28 X10(3)/MCL (ref 0–0.9)
EOSINOPHIL NFR BLD AUTO: 3.1 %
ERYTHROCYTE [DISTWIDTH] IN BLOOD BY AUTOMATED COUNT: 13.2 % (ref 11.5–17)
GFR SERPLBLD CREATININE-BSD FMLA CKD-EPI: >60 ML/MIN/1.73/M2
GLUCOSE SERPL-MCNC: 122 MG/DL (ref 74–100)
GLUCOSE UR QL STRIP: NEGATIVE
HCT VFR BLD AUTO: 38 % (ref 37–47)
HEMOCCULT SP1 STL QL: NEGATIVE
HGB BLD-MCNC: 12.7 G/DL (ref 12–16)
HGB UR QL STRIP: NEGATIVE
IMM GRANULOCYTES # BLD AUTO: 0.03 X10(3)/MCL (ref 0–0.04)
IMM GRANULOCYTES NFR BLD AUTO: 0.3 %
KETONES UR QL STRIP: ABNORMAL
LEUKOCYTE ESTERASE UR QL STRIP: ABNORMAL
LYMPHOCYTES # BLD AUTO: 2.38 X10(3)/MCL (ref 0.6–4.6)
LYMPHOCYTES NFR BLD AUTO: 26.2 %
MCH RBC QN AUTO: 28.6 PG (ref 27–31)
MCHC RBC AUTO-ENTMCNC: 33.4 G/DL (ref 33–36)
MCV RBC AUTO: 85.6 FL (ref 80–94)
MONOCYTES # BLD AUTO: 0.5 X10(3)/MCL (ref 0.1–1.3)
MONOCYTES NFR BLD AUTO: 5.5 %
MUCOUS THREADS URNS QL MICRO: ABNORMAL /LPF
NEUTROPHILS # BLD AUTO: 5.87 X10(3)/MCL (ref 2.1–9.2)
NEUTROPHILS NFR BLD AUTO: 64.5 %
NITRITE UR QL STRIP: NEGATIVE
NRBC BLD AUTO-RTO: 0 %
PH UR STRIP: 6 [PH]
PLATELET # BLD AUTO: 311 X10(3)/MCL (ref 130–400)
PMV BLD AUTO: 8.9 FL (ref 7.4–10.4)
POTASSIUM SERPL-SCNC: 3.4 MMOL/L (ref 3.5–5.1)
PROT UR QL STRIP: 30
RBC # BLD AUTO: 4.44 X10(6)/MCL (ref 4.2–5.4)
RBC #/AREA URNS AUTO: ABNORMAL /HPF
SODIUM SERPL-SCNC: 139 MMOL/L (ref 136–145)
SP GR UR STRIP.AUTO: >=1.03 (ref 1–1.03)
SQUAMOUS #/AREA URNS AUTO: ABNORMAL /HPF
UROBILINOGEN UR STRIP-ACNC: 1
WBC # BLD AUTO: 9.1 X10(3)/MCL (ref 4.5–11.5)
WBC #/AREA URNS AUTO: ABNORMAL /HPF

## 2025-07-20 PROCEDURE — 81025 URINE PREGNANCY TEST: CPT | Performed by: EMERGENCY MEDICINE

## 2025-07-20 PROCEDURE — 99283 EMERGENCY DEPT VISIT LOW MDM: CPT

## 2025-07-20 PROCEDURE — 81003 URINALYSIS AUTO W/O SCOPE: CPT | Performed by: EMERGENCY MEDICINE

## 2025-07-20 PROCEDURE — 82272 OCCULT BLD FECES 1-3 TESTS: CPT | Performed by: EMERGENCY MEDICINE

## 2025-07-20 PROCEDURE — 85025 COMPLETE CBC W/AUTO DIFF WBC: CPT | Performed by: EMERGENCY MEDICINE

## 2025-07-20 PROCEDURE — 80048 BASIC METABOLIC PNL TOTAL CA: CPT | Performed by: EMERGENCY MEDICINE

## 2025-07-20 RX ORDER — HYDROCORTISONE 1 %
CREAM (GRAM) TOPICAL
Qty: 30 G | Refills: 0 | Status: SHIPPED | OUTPATIENT
Start: 2025-07-20

## 2025-07-20 NOTE — ED PROVIDER NOTES
ED PROVIDER NOTE  2025    CHIEF COMPLAINT:   Chief Complaint   Patient presents with    Rectal Bleeding     Blood in stool when  i use the bathroom for past two days       HISTORY OF PRESENT ILLNESS:   Radha Tian is a 29 y.o. female who presents with chief complaint Rectal bleeding.  Patient reports over the past 2 days she has had some blood whenever having a bowel movement.  States she was constipated and straining.  Reports having some discomfort in her anus whenever she sits down.  Denies any known history of hemorrhoids.  Denies abdominal pain.    The history is provided by the patient.         REVIEW OF SYSTEMS: as noted in the HPI.  NURSING NOTES REVIEWED      PAST MEDICAL/SURGICAL HISTORY:   Past Medical History:   Diagnosis Date    Gestational HTN       Past Surgical History:   Procedure Laterality Date    TONSILLECTOMY         FAMILY HISTORY:   Family History   Problem Relation Name Age of Onset    No Known Problems Paternal Grandfather      No Known Problems Paternal Grandmother      No Known Problems Maternal Grandmother      No Known Problems Maternal Grandfather      No Known Problems Father       labor Mother      Hypertension Mother         SOCIAL HISTORY: Social History[1]    ALLERGIES:   Review of patient's allergies indicates:   Allergen Reactions    Pcn [penicillins]        PHYSICAL EXAM:  Initial Vitals [25 0032]   BP Pulse Resp Temp SpO2   118/85 97 20 98.6 °F (37 °C) 97 %      MAP       --         Physical Exam    Nursing note and vitals reviewed.  Constitutional: She appears well-developed and well-nourished.   HENT:   Head: Normocephalic and atraumatic. Mouth/Throat: Uvula is midline and mucous membranes are normal.   Eyes: EOM are normal. Pupils are equal, round, and reactive to light.   Neck: Trachea normal. Neck supple.   Cardiovascular:  Normal rate, regular rhythm and normal pulses.           Pulmonary/Chest: Effort normal and breath sounds normal.   Abdominal:  Abdomen is soft. Bowel sounds are normal. There is no abdominal tenderness. There is no rebound and no guarding.   Genitourinary: Rectum:      External hemorrhoid present.      Genitourinary Comments: Chaperone present.  No gross blood on digital rectal exam.  Enlarged external hemorrhoid present, no evidence of thrombosis.     Musculoskeletal:         General: Normal range of motion.      Cervical back: Neck supple.     Neurological: She is alert and oriented to person, place, and time. GCS eye subscore is 4. GCS verbal subscore is 5. GCS motor subscore is 6.   Skin: Skin is warm and dry.         RESULTS:  Labs Reviewed   BASIC METABOLIC PANEL - Abnormal       Result Value    Sodium 139      Potassium 3.4 (*)     Chloride 107      CO2 22      Glucose 122 (*)     Blood Urea Nitrogen 9.0      Creatinine 0.84      BUN/Creatinine Ratio 11      Calcium 9.5      Anion Gap 10.0      eGFR >60     URINALYSIS, REFLEX TO URINE CULTURE - Abnormal    Color, UA Yellow      Appearance, UA Slightly Cloudy (*)     Specific Gravity, UA >=1.030      pH, UA 6.0      Protein, UA 30 (*)     Glucose, UA Negative      Ketones, UA Trace (*)     Blood, UA Negative      Bilirubin, UA Small (*)     Urobilinogen, UA 1.0      Nitrites, UA Negative      Leukocyte Esterase, UA Trace (*)    PREGNANCY TEST, URINE RAPID - Normal    hCG Qualitative, Urine Negative     OCCULT BLOOD, STOOL 1ST SPECIMEN - Normal    Occult Blood Stool 1 Negative     CBC W/ AUTO DIFFERENTIAL    Narrative:     The following orders were created for panel order CBC auto differential.  Procedure                               Abnormality         Status                     ---------                               -----------         ------                     CBC with Differential[[id number]]                           Final result                 Please view results for these tests on the individual orders.   CBC WITH DIFFERENTIAL    WBC 9.10      RBC 4.44      Hgb 12.7      Hct  "38.0      MCV 85.6      MCH 28.6      MCHC 33.4      RDW 13.2      Platelet 311      MPV 8.9      Neut % 64.5      Lymph % 26.2      Mono % 5.5      Eos % 3.1      Basophil % 0.4      Imm Grans % 0.3      Neut # 5.87      Lymph # 2.38      Mono # 0.50      Eos # 0.28      Baso # 0.04      Imm Gran # 0.03      NRBC% 0.0     URINALYSIS, MICROSCOPIC   POCT OCCULT BLOOD STOOL     Imaging Results    None         PROCEDURES:  Procedures    ECG:       ED COURSE AND MEDICAL DECISION MAKING:  Medications - No data to display  ED Course as of 07/20/25 0114   Sun Jul 20, 2025   0056 WBC: 9.10 [IB]   0056 Hemoglobin: 12.7 [IB]   0056 Platelet Count: 311 [IB]   0103 hCG Qualitative, Urine: Negative [IB]   0105 NITRITE UA: Negative [IB]   0105 Leukocyte Esterase, UA(!): Trace [IB]   0113 Creatinine: 0.84 [IB]   0113 Occult Blood: Negative [IB]   0113 BUN: 9.0 [IB]      ED Course User Index  [IB] Anand Magaña,         Medical Decision Making  Well-appearing 29-year-old female who presents with rectal bleeding over the past 2 days associated with constipation and straining to have a bowel movement.  Differential diagnosis includes external hemorrhoid, internal hemorrhoid, anal fissure, IBD, among others.  She does have an external hemorrhoid on exam.  Patient states she was told that "you can only get hemorrhoids if you eat spicy food." Discussed bowel regimen and not sitting for prolonged period of time in the toilet.  CBC unremarkable.  Hemoccult negative.  We will refer to GI and discharge with preparation H. Given strict ED return precautions. I have spoken with the patient and/or caregivers. I have explained the patient's condition, diagnoses and treatment plan based on the information available to me at this time. I have answered the patient's and/or caregiver's questions and addressed any concerns. The patient and/or caregivers have as good an understanding of the patient's diagnosis, condition and treatment plan as can " be expected at this point. The vital signs have been stable. The patient's condition is stable and appropriate for discharge from the emergency department.     The patient will pursue further outpatient evaluation with the primary care physician or other designated or consulting physician as outlined in the discharge instructions. The patient and/or caregivers are agreeable to this plan of care and follow-up instructions have been explained in detail. The patient and/or caregivers have received these instructions in written format and have expressed an understanding of the discharge instructions. The patient and/or caregivers are aware that any significant change in condition or worsening of symptoms should prompt an immediate return to this or the closest emergency department or a call to 911.    Amount and/or Complexity of Data Reviewed  External Data Reviewed: labs and notes.  Labs: ordered. Decision-making details documented in ED Course.    Risk  OTC drugs.  Prescription drug management.  Diagnosis or treatment significantly limited by social determinants of health.        CLINICAL IMPRESSION:  1. External hemorrhoid    2. Rectal bleeding        DISPOSITION:                [1]   Social History  Tobacco Use    Smoking status: Never     Passive exposure: Never    Smokeless tobacco: Never   Substance Use Topics    Alcohol use: Never    Drug use: Never        Anand Magaña DO  07/20/25 0114

## 2025-07-20 NOTE — Clinical Note
"Radha Adames" Asmita was seen and treated in our emergency department on 7/20/2025.  She may return to work on 07/20/2025.       If you have any questions or concerns, please don't hesitate to call.       RN    "

## 2025-07-20 NOTE — LETTER
"     [facility] - Emergency Dept  [address]  [facility] [address]  Phone: [phone]   July 20, 2025    Patient: Radha Tian (Rameka)   YOB: 1995   Date of Visit: 7/20/2025   Patient ID [id number]       To Whom It May Concern:    Radha Tian (Rameka) was seen and treated in our emergency department on 7/20/2025. She {Return to school/sport/work:[id number]}.      Sincerely,          ,       "

## 2025-07-23 ENCOUNTER — HOSPITAL ENCOUNTER (EMERGENCY)
Facility: HOSPITAL | Age: 30
Discharge: HOME | End: 2025-07-23
Attending: EMERGENCY MEDICINE
Payer: MEDICAID

## 2025-07-23 ENCOUNTER — TELEPHONE (OUTPATIENT)
Dept: GYNECOLOGY | Facility: CLINIC | Age: 30
End: 2025-07-23
Payer: MEDICAID

## 2025-07-23 VITALS
RESPIRATION RATE: 18 BRPM | OXYGEN SATURATION: 99 % | HEART RATE: 89 BPM | HEIGHT: 63 IN | BODY MASS INDEX: 37.92 KG/M2 | TEMPERATURE: 98 F | SYSTOLIC BLOOD PRESSURE: 136 MMHG | DIASTOLIC BLOOD PRESSURE: 87 MMHG | WEIGHT: 214 LBS

## 2025-07-23 DIAGNOSIS — K08.89 PAIN, DENTAL: Primary | ICD-10-CM

## 2025-07-23 PROCEDURE — 99284 EMERGENCY DEPT VISIT MOD MDM: CPT

## 2025-07-23 RX ORDER — HYDROCODONE BITARTRATE AND ACETAMINOPHEN 7.5; 325 MG/1; MG/1
1 TABLET ORAL EVERY 6 HOURS PRN
Qty: 12 TABLET | Refills: 0 | Status: SHIPPED | OUTPATIENT
Start: 2025-07-23

## 2025-07-23 RX ORDER — CHLORHEXIDINE GLUCONATE ORAL RINSE 1.2 MG/ML
15 SOLUTION DENTAL 2 TIMES DAILY
Qty: 420 ML | Refills: 0 | Status: SHIPPED | OUTPATIENT
Start: 2025-07-23 | End: 2025-08-06

## 2025-07-23 NOTE — Clinical Note
"Radha Whaleyponce Tian was seen and treated in our emergency department on 7/23/2025.  She may return to work on 07/26/2025.       If you have any questions or concerns, please don't hesitate to call.      Michelle Donovan, NICOLE"

## 2025-08-27 ENCOUNTER — HOSPITAL ENCOUNTER (EMERGENCY)
Facility: HOSPITAL | Age: 30
Discharge: HOME | End: 2025-08-27
Attending: INTERNAL MEDICINE
Payer: MEDICAID

## 2025-08-27 VITALS
HEIGHT: 63 IN | HEART RATE: 90 BPM | DIASTOLIC BLOOD PRESSURE: 92 MMHG | WEIGHT: 214 LBS | BODY MASS INDEX: 37.92 KG/M2 | OXYGEN SATURATION: 97 % | RESPIRATION RATE: 18 BRPM | TEMPERATURE: 98 F | SYSTOLIC BLOOD PRESSURE: 143 MMHG

## 2025-08-27 DIAGNOSIS — R10.9 ABDOMINAL PAIN: ICD-10-CM

## 2025-08-27 DIAGNOSIS — K80.50 BILIARY COLIC: Primary | ICD-10-CM

## 2025-08-27 LAB
ALBUMIN SERPL-MCNC: 3.7 G/DL (ref 3.5–5)
ALBUMIN/GLOB SERPL: 0.8 RATIO (ref 1.1–2)
ALP SERPL-CCNC: 101 UNIT/L (ref 40–150)
ALT SERPL-CCNC: 12 UNIT/L (ref 0–55)
ANION GAP SERPL CALC-SCNC: 8 MEQ/L
AST SERPL-CCNC: 16 UNIT/L (ref 11–45)
B-HCG UR QL: NEGATIVE
BACTERIA #/AREA URNS AUTO: ABNORMAL /HPF
BASOPHILS # BLD AUTO: 0.05 X10(3)/MCL
BASOPHILS NFR BLD AUTO: 0.4 %
BILIRUB SERPL-MCNC: 0.2 MG/DL
BILIRUB UR QL STRIP.AUTO: NEGATIVE
BUN SERPL-MCNC: 10.6 MG/DL (ref 7–18.7)
CALCIUM SERPL-MCNC: 9.6 MG/DL (ref 8.4–10.2)
CHLORIDE SERPL-SCNC: 107 MMOL/L (ref 98–107)
CLARITY UR: CLEAR
CO2 SERPL-SCNC: 26 MMOL/L (ref 22–29)
COLOR UR AUTO: ABNORMAL
CREAT SERPL-MCNC: 0.93 MG/DL (ref 0.55–1.02)
CREAT/UREA NIT SERPL: 11
EOSINOPHIL # BLD AUTO: 0.17 X10(3)/MCL (ref 0–0.9)
EOSINOPHIL NFR BLD AUTO: 1.3 %
ERYTHROCYTE [DISTWIDTH] IN BLOOD BY AUTOMATED COUNT: 13.2 % (ref 11.5–17)
GFR SERPLBLD CREATININE-BSD FMLA CKD-EPI: >60 ML/MIN/1.73/M2
GLOBULIN SER-MCNC: 4.9 GM/DL (ref 2.4–3.5)
GLUCOSE SERPL-MCNC: 101 MG/DL (ref 74–100)
GLUCOSE UR QL STRIP: NEGATIVE
HCT VFR BLD AUTO: 38 % (ref 37–47)
HGB BLD-MCNC: 12.5 G/DL (ref 12–16)
HGB UR QL STRIP: NEGATIVE
IMM GRANULOCYTES # BLD AUTO: 0.04 X10(3)/MCL (ref 0–0.04)
IMM GRANULOCYTES NFR BLD AUTO: 0.3 %
KETONES UR QL STRIP: NEGATIVE
LEUKOCYTE ESTERASE UR QL STRIP: ABNORMAL
LIPASE SERPL-CCNC: 18 U/L
LYMPHOCYTES # BLD AUTO: 2.23 X10(3)/MCL (ref 0.6–4.6)
LYMPHOCYTES NFR BLD AUTO: 17.3 %
MAGNESIUM SERPL-MCNC: 2 MG/DL (ref 1.6–2.6)
MCH RBC QN AUTO: 28.8 PG (ref 27–31)
MCHC RBC AUTO-ENTMCNC: 32.9 G/DL (ref 33–36)
MCV RBC AUTO: 87.6 FL (ref 80–94)
MONOCYTES # BLD AUTO: 1.02 X10(3)/MCL (ref 0.1–1.3)
MONOCYTES NFR BLD AUTO: 7.9 %
NEUTROPHILS # BLD AUTO: 9.38 X10(3)/MCL (ref 2.1–9.2)
NEUTROPHILS NFR BLD AUTO: 72.8 %
NITRITE UR QL STRIP: NEGATIVE
NRBC BLD AUTO-RTO: 0 %
PH UR STRIP: 7 [PH]
PLATELET # BLD AUTO: 353 X10(3)/MCL (ref 130–400)
PMV BLD AUTO: 9.7 FL (ref 7.4–10.4)
POTASSIUM SERPL-SCNC: 3.9 MMOL/L (ref 3.5–5.1)
PROT SERPL-MCNC: 8.6 GM/DL (ref 6.4–8.3)
PROT UR QL STRIP: NEGATIVE
RBC # BLD AUTO: 4.34 X10(6)/MCL (ref 4.2–5.4)
RBC #/AREA URNS AUTO: ABNORMAL /HPF
SODIUM SERPL-SCNC: 141 MMOL/L (ref 136–145)
SP GR UR STRIP.AUTO: 1.02 (ref 1–1.03)
SQUAMOUS #/AREA URNS AUTO: ABNORMAL /HPF
UROBILINOGEN UR STRIP-ACNC: 1
WBC # BLD AUTO: 12.89 X10(3)/MCL (ref 4.5–11.5)
WBC #/AREA URNS AUTO: ABNORMAL /HPF

## 2025-08-27 PROCEDURE — 85025 COMPLETE CBC W/AUTO DIFF WBC: CPT | Performed by: EMERGENCY MEDICINE

## 2025-08-27 PROCEDURE — 80053 COMPREHEN METABOLIC PANEL: CPT | Performed by: EMERGENCY MEDICINE

## 2025-08-27 PROCEDURE — 25500020 PHARM REV CODE 255: Performed by: EMERGENCY MEDICINE

## 2025-08-27 PROCEDURE — 25000003 PHARM REV CODE 250: Performed by: EMERGENCY MEDICINE

## 2025-08-27 PROCEDURE — 96374 THER/PROPH/DIAG INJ IV PUSH: CPT

## 2025-08-27 PROCEDURE — 83735 ASSAY OF MAGNESIUM: CPT | Performed by: EMERGENCY MEDICINE

## 2025-08-27 PROCEDURE — 63600175 PHARM REV CODE 636 W HCPCS: Performed by: EMERGENCY MEDICINE

## 2025-08-27 PROCEDURE — 81025 URINE PREGNANCY TEST: CPT | Performed by: INTERNAL MEDICINE

## 2025-08-27 PROCEDURE — 99285 EMERGENCY DEPT VISIT HI MDM: CPT | Mod: 25

## 2025-08-27 PROCEDURE — 96375 TX/PRO/DX INJ NEW DRUG ADDON: CPT

## 2025-08-27 PROCEDURE — 81003 URINALYSIS AUTO W/O SCOPE: CPT | Performed by: INTERNAL MEDICINE

## 2025-08-27 PROCEDURE — 96372 THER/PROPH/DIAG INJ SC/IM: CPT | Performed by: EMERGENCY MEDICINE

## 2025-08-27 PROCEDURE — 83690 ASSAY OF LIPASE: CPT | Performed by: EMERGENCY MEDICINE

## 2025-08-27 PROCEDURE — 96361 HYDRATE IV INFUSION ADD-ON: CPT

## 2025-08-27 RX ORDER — DICYCLOMINE HYDROCHLORIDE 20 MG/1
20 TABLET ORAL 2 TIMES DAILY
Qty: 20 TABLET | Refills: 0 | Status: SHIPPED | OUTPATIENT
Start: 2025-08-27 | End: 2025-09-26

## 2025-08-27 RX ORDER — KETOROLAC TROMETHAMINE 30 MG/ML
15 INJECTION, SOLUTION INTRAMUSCULAR; INTRAVENOUS
Status: COMPLETED | OUTPATIENT
Start: 2025-08-27 | End: 2025-08-27

## 2025-08-27 RX ORDER — KETOROLAC TROMETHAMINE 10 MG/1
10 TABLET, FILM COATED ORAL EVERY 6 HOURS
Qty: 20 TABLET | Refills: 0 | Status: SHIPPED | OUTPATIENT
Start: 2025-08-27 | End: 2025-09-01

## 2025-08-27 RX ORDER — ONDANSETRON HYDROCHLORIDE 2 MG/ML
4 INJECTION, SOLUTION INTRAVENOUS
Status: COMPLETED | OUTPATIENT
Start: 2025-08-27 | End: 2025-08-27

## 2025-08-27 RX ORDER — DICYCLOMINE HYDROCHLORIDE 10 MG/ML
20 INJECTION INTRAMUSCULAR
Status: COMPLETED | OUTPATIENT
Start: 2025-08-27 | End: 2025-08-27

## 2025-08-27 RX ORDER — ONDANSETRON 4 MG/1
4 TABLET, ORALLY DISINTEGRATING ORAL 2 TIMES DAILY
Qty: 10 TABLET | Refills: 0 | Status: SHIPPED | OUTPATIENT
Start: 2025-08-27

## 2025-08-27 RX ADMIN — IOHEXOL 100 ML: 350 INJECTION, SOLUTION INTRAVENOUS at 08:08

## 2025-08-27 RX ADMIN — ONDANSETRON 4 MG: 2 INJECTION INTRAMUSCULAR; INTRAVENOUS at 07:08

## 2025-08-27 RX ADMIN — DICYCLOMINE HYDROCHLORIDE 20 MG: 10 INJECTION, SOLUTION INTRAMUSCULAR at 07:08

## 2025-08-27 RX ADMIN — SODIUM CHLORIDE 1000 ML: 9 INJECTION, SOLUTION INTRAVENOUS at 07:08

## 2025-08-27 RX ADMIN — KETOROLAC TROMETHAMINE 15 MG: 30 INJECTION, SOLUTION INTRAMUSCULAR; INTRAVENOUS at 07:08
